# Patient Record
Sex: MALE | Race: WHITE
[De-identification: names, ages, dates, MRNs, and addresses within clinical notes are randomized per-mention and may not be internally consistent; named-entity substitution may affect disease eponyms.]

---

## 2017-02-07 NOTE — HP
DATE OF ADMISSION: 02/07/2017

 

TIME OF EVALUATION:  1500  hours.

 

REASON FOR ADMISSION:  Hematuria.

 

CONSULTATIONS:  Dr. Antonio Carr, Urology.

 

HOSPITAL COURSE:  This is a 62-year-old male with past medical history of 
benign prostatic hypertrophy status post TURP in 2015, who came to the 
emergency room with chief complaint of lower back pain, abdominal pain and 
painful urination with associated hematuria for the past 3 days.  The patient 
denied any fevers or chills.  The patient denied any diarrhea.  The patient was 
complaining of being constipated lately.  The patient denied any hematochezia 
or melena.  The patient denied any nausea or vomiting.  The patient verbalized 
that he tried over-the-counter Motrin with minimal pain relief.  The patient 
verbalized that he was not been able to sleep for the past 2 nights because of 
significant pain.  The patient denied any other significant past medical 
history including any essential hypertension or diabetes.

 

In the emergency room, the patient was noticed to have elevated AST and 
elevated alkaline phosphatase.  The patient underwent a CT scan of the abdomen 
and pelvis that showed significantly enlarged prostate with adjacent urinary 
bladder wall thickening. The CT also revealed retroperitoneal and left iliac 
lymphadenopathy along with solid nodules in the perirectal and ischiorectal 
fat.  The CT also revealed multiple osteosclerotic lesions.  The CT revealed 
significantly distended urinary bladder.  The patient's urinalysis showed 3+ 
hemoglobin.  The patient was treated with IV analgesics and IV antiemetics in 
the emergency room.  A urology consult was called by the ER physician.

 

PAST MEDICAL HISTORY:  Benign prostatic hypertrophy.

 

PAST SURGICAL HISTORY:  TURP on 09/30/2015,  left knee surgery.

 

HOME MEDICATIONS:  None.

 

ALLERGIES:  CIPROFLOXACIN.

 

SOCIAL HISTORY:  The patient lives at home.  The patient is currently retired.  
Denies any use of alcohol or tobacco.

 

FAMILY HISTORY:  Positive for CAD and dyslipidemia.

 

REVIEW OF SYSTEMS:  A 12-point review of systems were made and review of 
systems was negative other than what is mentioned in history of present illness.

 

PHYSICAL EXAMINATION:

VITAL SIGNS:  Temperature 97.9, pulse rate 87, respiratory rate 19, blood 
pressure 143/93, oxygen saturation 98% on room air.

GENERAL:  This is a well-built, well-nourished  male lying in bed in no 
apparent distress.

HEENT:  Head normocephalic and atraumatic.  Eyes:  Anicteric sclerae.  
Conjunctivae clear.

ENT:  Nasal septum is midline.  Oral mucosa is moist.

NECK:  Supple.  No JVD noticed.

RESPIRATORY:  Bilaterally clear to auscultation.  No adventitious breath 
sounds.  No use of accessory muscles of respiration.

CARDIAC:  Regular rate and rhythm.  No murmurs.

GASTROINTESTINAL:  Abdomen soft, nontender, nondistended.  Bowel sounds 
positive in all 4 quadrants.

GENITOURINARY:  Deferred.

EXTREMITIES:  No cyanosis, no clubbing, no edema.  Peripheral pulses are 
palpable.

NEUROLOGIC:  The patient is awake, alert and oriented.  Cranial nerves are 
grossly intact.

 

LABORATORY AND DIAGNOSTIC DATA:  WBC 10.1, hemoglobin 14.3, hematocrit 42.3, 
platelet count 205.  Sodium 142, potassium 4.6, chloride 90, carbon dioxide 30, 
anion gap 19, BUN 24, creatinine 0.9, glucose 120, calcium 9.6, AST 59, ALT 29, 
alkaline phosphatase 346.  



Urinalysis:  Urine nitrite negative,  leukocyte esterase negative, urine 
hemoglobin 3+, urine total protein 2+.  



CT scan of the abdomen and pelvis:  Prostate is significantly enlarged.  
Adjacent urinary bladder wall thickening is seen.  Retroperitoneal and left 
iliac lymphadenopathy is identified along with solid nodules in the perirectal 
and ischiorectal fat.  Multiple new osteosclerotic lesions.  Findings are 
consistent with new metastatic disease.  The urinary bladder is significantly 
distended, suggestive of urinary retention.  Small nonobstructive left renal 
calculus without ureterolithiasis or hydroureteronephrosis.

 

IMPRESSION:  This is a 62-year-old male who came to the emergency room with 
chief complaint of hematuria, was found to have enlarged prostate on imaging 
studies with possible underlying malignant process.  He will be admitted here 
for further treatment and evaluation.



 

ASSESSMENT AND PLAN:

1.  Hematuria.  Etiology unclear.  The patient's H and H will be monitored 
closely.  The patient will be transfused as needed.  Urology consult will be 
obtained.



2.  Enlarged prostate gland with associated lymphadenopathy and osteosclerotic 
lesions concerning for malignancy.  A prostate specific antigen will be 
obtained.  Will obtain urology consult.  Will await urology recommendations 
regarding this. Will involve oncology on the case after urology evaluation.



Plan. The patient  will be admitted to inpatient medical/surgical floor.  The 
patient will be started on DVT prophylaxis and gastrointestinal prophylaxis.  A 
chemical DVT prophylaxis will be avoided because of underlying hematuria.  The 
patient will be started on a regular diet.  The patient will remain a FULL 
CODE.  Activities will be as tolerated.

 

The rest of the patient's management will be based on the clinical course, the 
results of diagnostic studies, and inputs from consultants.

 

Based on the patient's clinical presentation, he most probably requires more 
than 2 midnights' stay for further management and evaluation of his clinical 
presentation.

 

The case and management of this patient was fully discussed with Dr. Mckeon.

 

Approximately 50 minutes was spent on the history and physical on this patient.

 

 

FLOR MCKEON MD, AM/EMILIA

DD:    02/07/2017 15:26:37

DT:    02/07/2017 15:59:30

Conf#: [id number]

DID#:  [id number]

 

MTDD

## 2017-02-07 NOTE — RADRPT
PROCEDURE:   CT Abdomen and Pelvis without contrast. 

 

CLINICAL INDICATION:    Hematuria, low back pain

 

TECHNIQUE:   CT of the abdomen and pelvis was performed on a multi-detector scanner without IV contr
ast.  Coronal and sagittal images were reformatted from the axial data set.  One or more of the foll
owing dose reduction techniques were used: automated exposure control,  adjustment of the mA and/or 
kV according to patient size, use of iterative reconstruction technique.  CTDI = 11.98 mGy. DLP = 68
7.8 mGy-cm.

 

COMPARISON:   CT, 09/26/2015 

 

FINDINGS:

 

CT abdomen:

 

The lung bases are clear.  The heart size is normal, without pericardial effusion.  Coronary arteria
l calcifications are present.  Liver, gallbladder, biliary tree, pancreas, spleen and adrenal glands
 are unremarkable.  Bilateral benign renal cysts are noted.  Tiny nonobstructive left renal calculus
 is identified, without ureterolithiasis or obstructive uropathy.  Small hiatal hernia is noted.  Th
e stomach is otherwise grossly unremarkable.

 

The aorta is of normal caliber.  Aortoiliac atherosclerotic calcifications are present.  Enlarged ri
ght retroperitoneal lymph node is identified measuring 3.0 x 1.7 cm (601-57).  The yamilet hepatis reg
ion is clear.  

 

CT pelvis:

 

No bowel obstruction, free intraperitoneal air or abscess is identified.  No diverticulosis, diverti
culitis or colitis is identified.  The appendix is well visualized and normal.  Urinary bladder is m
arkedly distended, concerning for urinary retention.  Prostate is enlarged and demonstrates irregula
r contour and posterior bulging on the left.  Posterior urinary bladder wall appears thickened, grea
ter on the right - this grossly appears contiguous with the enlarged prostate.  Right iliac lymphade
nopathy is identified measuring up to 3.6 x 2.4 cm (3-96).  Multiple solid nodules are identified wi
thin perirectal and ischiorectal fat as well measuring up to 2.0 x 1.4 cm in the left perirectal spa
ce (3-144), and 2.0 x 1.8 cm in the right ischiorectal fat (3-140).

 

Multiple osteosclerotic foci are seen involving right acetabulum and inferior pubic ramus, bilateral
 iliac bones, right sacrum, T11, L1 and L5, consistent with osseous metastatic disease.  There is de
generative spondylosis of the spine.

 

IMPRESSION:

 

1.  Prostate is significantly enlarged, demonstrating abnormal contour bulging posteriorly on the le
ft.  Adjacent urinary bladder wall thickening is seen, which grossly appears contiguous with the enl
arged prostate.  Findings are suggestive of prostate cancer, though synchronous neoplasm of the urin
yimi bladder is also possible.

2.  Retroperitoneal and left iliac lymphadenopathy is identified, along with solid nodules in the pe
rirectal and ischiorectal fat, all of which are new when compared to the prior exam.  In addition, m
ultiple new osteosclerotic lesions are identified.  Findings are consistent with new metastatic dise
ase, most likely of prostate origin.

3.  Urinary bladder is significantly distended, suggestive of urinary retention.

4.  Small nonobstructive left renal calculus is noted, without ureterolithiasis or hydroureteronephr
osis.

 

RPTAT: QQ

_____________________________________________ 

.Husam Nunes MD, MD           Date    Time 

Electronically viewed and signed by .Husam Nunes MD, MD on 02/07/2017 13:14 

 

D:  02/07/2017 13:14  T:  02/07/2017 13:14

.R/

## 2017-02-07 NOTE — ERA
ER Documentation


Chief Complaint


Date/Time


DATE: 17 


TIME: 12:21


Chief Complaint


left lower flank pain with hematuria and urinary retention for 3 days.





HPI


The patient is a 62-year-old female, presenting to the ER because of lower back 

pain, abdominal pain, painful urination for the last 3 days.  He had similar 

symptoms previously, denies fever, chills, neck pain, chest pain, dyspnea, 

vomiting, diarrhea, constipation.  He does not smoke does not drink





Past medical history: Anemia, history of urinary retention, BPH


Past surgical history: TURP 3 years ago, left knee arthroscopy





ROS


All systems reviewed and are negative except as per history of present illness.





Medications


Home Meds


Active Scripts


Cephalexin* (Keflex*) 500 Mg Capsule, 500 MG PO QID for 7 Days, CAP


   Prov:RADHA PEREZ MD         16





Allergies


Allergies:  


Coded Allergies:  


     ciprofloxacin (Verified  Allergy, Mild, 4/15/16)





PMhx/Soc


History of Surgery:  Yes (KNEE SX,CYSTOSCOPY)


Anesthesia Reaction:  No


Hx Neurological Disorder:  No


Hx Respiratory Disorders:  No


Hx Cardiac Disorders:  No


Hx Psychiatric Problems:  No


Hx Miscellaneous Medical Probl:  No


Hx Alcohol Use:  No


Hx Substance Use:  No


Hx Tobacco Use:  No





Physical Exam


Vitals





Vital Signs








  Date Time  Temp Pulse Resp B/P Pulse Ox O2 Delivery O2 Flow Rate FiO2


 


17 08:54 97.9 100 21 144/89 99   








Physical Exam


 Const:      No acute distress.


 Head:        Atraumatic.


 Eyes:       Normal Conjunctiva.


 ENT:         Normal External Ears, Nose and Mouth.


 Neck:        Full range of motion.  No meningismus.


 Resp:         Clear to auscultation bilaterally.


 Cardio:       Regular rate and rhythm, no murmurs.


 Abd:         Soft,  non distended, normal bowel sounds, diffuse abdominal 

tenderness, no rigidity, rebound, CVA tenderness.  Mild lumbar tenderness


 Skin:         No petechiae or rashes.


 Back:        No midline or flank tenderness.


 Ext:          No cyanosis, or edema.


 Neur:        Awake and alert. No focal deficit


 Psych:        Normal Mood and Affect.


Result Diagram:  


17 1305                                                                    

            17 1305





Results 24 hrs





 Laboratory Tests








Test


  17


13:05


 


Alanine Aminotransferase


(ALT/SGPT) 29IU/L 


 


 


Albumin 4.1g/dl 


 


Albumin/Globulin Ratio 1.20 


 


Alkaline Phosphatase 346IU/L 


 


Anion Gap 19 


 


Aspartate Amino Transf


(AST/SGOT) 59IU/L 


 


 


Basophils # 0.010^3/ul 


 


Basophils % 0.2% 


 


Blood Morphology Comment  


 


Blood Urea Nitrogen 24mg/dl 


 


Calcium Level 9.6mg/dl 


 


Carbon Dioxide Level 30mmol/L 


 


Chloride Level 98mmol/L 


 


Creatinine 0.95mg/dl 


 


Direct Bilirubin 0.00mg/dl 


 


Eosinophils # 0.010^3/ul 


 


Eosinophils % 0.3% 


 


Globulin 3.40g/dl 


 


Glucose Level 120mg/dl 


 


Hematocrit 42.3% 


 


Hemoglobin 14.3g/dl 


 


Indirect Bilirubin 0.9mg/dl 


 


Lipase 61U/L 


 


Lymphocytes # 1.010^3/ul 


 


Lymphocytes % 10.0% 


 


Mean Corpuscular Hemoglobin 30.6pg 


 


Mean Corpuscular Hemoglobin


Concent 33.9g/dl 


 


 


Mean Corpuscular Volume 90.3fl 


 


Mean Platelet Volume 6.9fl 


 


Monocytes # 0.810^3/ul 


 


Monocytes % 7.5% 


 


Neutrophils # 8.310^3/ul 


 


Neutrophils % 82.0% 


 


Nucleated Red Blood Cells # 0.010^3/ul 


 


Nucleated Red Blood Cells % 0.0/100WBC 


 


Platelet Count 71932^3/UL 


 


Potassium Level 4.6mmol/L 


 


Red Blood Count 4.6910^6/ul 


 


Red Cell Distribution Width 12.5% 


 


Sodium Level 142mmol/L 


 


Total Bilirubin 0.9mg/dl 


 


Total Protein 7.5g/dl 


 


Urine Bilirubin 1+ 


 


Urine Clarity CLOUDY 


 


Urine Color RED 


 


Urine Glucose NEGATIVE% 


 


Urine Hemoglobin 3+ 


 


Urine Ictotest Pending 


 


Urine Ketones TRACE 


 


Urine Leukocyte Esterase NEGATIVE 


 


Urine Microscopic RBC >200/HPF 


 


Urine Microscopic WBC 0-2/HPF 


 


Urine Nitrite NEGATIVE 


 


Urine Specific Gravity 1.015 


 


Urine Total Protein 2+ 


 


Urine Urobilinogen 0.2  E.U./dL 


 


Urine pH 5.0 


 


White Blood Count 10.110^3/ul 








 Current Medications








 Medications


  (Trade)  Dose


 Ordered  Sig/Ruddy


 Route


 PRN Reason  Start Time


 Stop Time Status Last Admin


Dose Admin


 


 Morphine Sulfate


  (morphine)  4 mg  ONCE  STAT


 IV


   17 12:33


 17 12:35 DC 17 12:33


 


 


 Ondansetron HCl


  (Zofran Inj)  4 mg  ONCE  STAT


 IV


   17 12:33


 17 12:35 DC 17 12:33


 











Procedures/[facility]


 [address] [address]


 Radiology Main Line: [phone]





 DIAGNOSTIC IMAGING REPORT





 Patient: JOSSELYN NORRIS   : 1955   Age: 62  Sex: M            

            


 MR #:    [id number]   Acct #:   [id number]    DOS: 17 1233


 Ordering MD: MASOUD MAHMOOD MD   Location:  E/R   Room/Bed:                  

                          


 








PROCEDURE:   CT Abdomen and Pelvis without contrast. 


 


CLINICAL INDICATION:    Hematuria, low back pain


 


TECHNIQUE:   CT of the abdomen and pelvis was performed on a multi-detector 

scanner without IV contrast.  Coronal and sagittal images were reformatted from 

the axial data set.  One or more of the following dose reduction techniques 

were used: automated exposure control,  adjustment of the mA and/or kV 

according to patient size, use of iterative reconstruction technique.  CTDI = 

11.98 mGy. DLP = 687.8 mGy-cm.


 


COMPARISON:   CT, 2015 


 


FINDINGS:


 


CT abdomen:


 


The lung bases are clear.  The heart size is normal, without pericardial 

effusion.  Coronary arterial calcifications are present.  Liver, gallbladder, 

biliary tree, pancreas, spleen and adrenal glands are unremarkable.  Bilateral 

benign renal cysts are noted.  Tiny nonobstructive left renal calculus is 

identified, without ureterolithiasis or obstructive uropathy.  Small hiatal 

hernia is noted.  The stomach is otherwise grossly unremarkable.


 


The aorta is of normal caliber.  Aortoiliac atherosclerotic calcifications are 

present.  Enlarged right retroperitoneal lymph node is identified measuring 3.0 

x 1.7 cm (601-57).  The yamilet hepatis region is clear.  


 


CT pelvis:


 


No bowel obstruction, free intraperitoneal air or abscess is identified.  No 

diverticulosis, diverticulitis or colitis is identified.  The appendix is well 

visualized and normal.  Urinary bladder is markedly distended, concerning for 

urinary retention.  Prostate is enlarged and demonstrates irregular contour and 

posterior bulging on the left.  Posterior urinary bladder wall appears thickened

, greater on the right - this grossly appears contiguous with the enlarged 

prostate.  Right iliac lymphadenopathy is identified measuring up to 3.6 x 2.4 

cm (3-96).  Multiple solid nodules are identified within perirectal and 

ischiorectal fat as well measuring up to 2.0 x 1.4 cm in the left perirectal 

space (3-144), and 2.0 x 1.8 cm in the right ischiorectal fat (3-140).


 


Multiple osteosclerotic foci are seen involving right acetabulum and inferior 

pubic ramus, bilateral iliac bones, right sacrum, T11, L1 and L5, consistent 

with osseous metastatic disease.  There is degenerative spondylosis of the 

spine.


 


IMPRESSION:


 


1.  Prostate is significantly enlarged, demonstrating abnormal contour bulging 

posteriorly on the left.  Adjacent urinary bladder wall thickening is seen, 

which grossly appears contiguous with the enlarged prostate.  Findings are 

suggestive of prostate cancer, though synchronous neoplasm of the urinary 

bladder is also possible.


2.  Retroperitoneal and left iliac lymphadenopathy is identified, along with 

solid nodules in the perirectal and ischiorectal fat, all of which are new when 

compared to the prior exam.  In addition, multiple new osteosclerotic lesions 

are identified.  Findings are consistent with new metastatic disease, most 

likely of prostate origin.


3.  Urinary bladder is significantly distended, suggestive of urinary retention.


4.  Small nonobstructive left renal calculus is noted, without ureterolithiasis 

or hydroureteronephrosis.


 


RPTAT: QQ


_____________________________________________ 


.Husam Nunes MD, MD           Date    Time 


Electronically viewed and signed by .Husam Nunes MD, MD on 2017 13:

14 


 


D:  2017 13:14  T:  2017 13:14


.R/





CC: MASOUD MAHMOOD MD





MEDICAL MAKING DECISION: The patient is a 62-year-old male, presenting with 

acute urinary retention, acute hematuria, possible metastatic prostate CA.  He 

was treated with morphine 4 mg IV for pain and Zofran formula IV for nausea 

with good response.  I have requested for Diego catheter to be inserted due to 

acute urinary retention.  The differential diagnoses considered include but are 

not limited to cholelithiasis, cholecystitis, cystitis, pancreatitis, hepatitis

, gastritis, peptic ulcer disease, gastric ulcer, appendicitis, diverticulitis, 

cholangitis, choledocholithiasis, partial small bowel obstruction.





Departure


Diagnosis:  


 Primary Impression:  


 Prostate cancer


 Additional Impressions:  


 Urinary retention


 Abnormal LFTs


Condition:  Stable


Comments


Consultation: I discussed the patient with the urologist Dr. Love who has 

seen him in the past.  He was made aware of the lab, treatment, the patient 

condition.  He accepted the consult at 2:20 PM





I discussed the findings with the patient. I discussed the patient with his 

physician   who was made aware of the lab, the treatment, the patient 

condition and my discussion with the urologist. The patient is admitted to 

medical surgery bed at 2:25 PM





The patient's blood pressure was elevated (>120/80) but appears stable without 

evidence of hypertension emergency or urgency.  The patient was counseled about 

the risks of hypertension and urged to pursue outpatient monitoring and therapy 

within a week after discharge with their primary care physician.











MASOUD MAHMOOD MD 2017 12:22

## 2017-02-08 NOTE — PN
Date/Time of Note


Date/Time of Note


DATE: 2/8/17 


TIME: 14:37





Assessment/Plan


VTE Prophylaxis


VTE Prophylaxis Intervention:  SCD's





Lines/Catheters


IV Catheter Type (from Nrsg):  Peripheral IV





Assessment/Plan


Assessment/Plan


1.  Urinary retention due to prostate cancer and urethral stricture, s/p 

dilatation, keep Diego, follow up with urology, follow up with urine c/s


2.  Metastatic adenocarcinoma of the prostate, on leuprolide monthly


3. Constipation, laxatives





Subjective


24 Hr Interval Summary


Free Text/Dictation


constipation, some low back pain





Exam/Review of Systems


Vital Signs


Vitals





 Vital Signs








  Date Time  Temp Pulse Resp B/P Pulse Ox O2 Delivery O2 Flow Rate FiO2


 


2/8/17 09:07 99.2 85 16 120/70 94   


 


2/7/17 17:50      Room Air  














 Intake and Output   


 


 2/7/17 2/7/17 2/8/17





 15:00 23:00 07:00


 


Intake Total   3400 ml


 


Output Total   2900 ml


 


Balance   500 ml











Exam


Constitutional:  alert, oriented, well developed


Psych:  nl mood/affect, no complaints


Head:  atraumatic, normocephalic


Eyes:  EOMI, PERRL, nl conjunctiva, nl lids


ENMT:  nl external ears & nose, nl lips & teeth


Neck:  non-tender, supple


Respiratory:  clear to auscultation, normal air movement, 


   No congested cough, No crackles/rales, No diminished breath sounds, No 

intercostal retraction, No labored breathing, No other, No respirations, No 

tactile fremitus, No wheezing


Cardiovascular:  nl pulses, regular rate and rhythm, 


   No S3, No S4, No bruits, No diastolic murmur, No edema, No gallop, No 

irregular rhythm, No jugular venous distention (JVD), No murmurs/extra sounds, 

No other, No rub, No systolic murmur


Gastrointestinal:  nl liver, spleen, non-tender, soft, 


   No ascites, No bowel sounds, No distended, No firm, No hepatomegaly, No mass

, No other, No rebound or guarding, No splenomegaly, No surgical scars, No 

tender


Musculoskeletal:  nl extremities to inspection


Extremities:  normal pulses, 


   No calf tenderness, No clubbing, No cyanosis, No edema, No other, No 

palpable cord, No pitting pedal edema, No tenderness


Neurological:  CNS II-XII intact, nl mental status, nl speech, nl strength


Skin:  nl turgor, rash or lesions





Results


Result Diagram:  


2/8/17 0510                                                                    

            2/8/17 0510





Results 24 hrs





Laboratory Tests








Test


  2/8/17


05:10


 


Alanine Aminotransferase


(ALT/SGPT) 29  


 


 


Albumin 3.7  


 


Albumin/Globulin Ratio 1.12  


 


Alkaline Phosphatase 325  H


 


Anion Gap 15  


 


Aspartate Amino Transf


(AST/SGOT) 53  H


 


 


Basophils # 0.0  


 


Basophils % 0.4  


 


Blood Urea Nitrogen 22  H


 


Calcium Level 8.8  


 


Carbon Dioxide Level 30  


 


Carcinoembryonic Antigen 4.5  


 


Chloride Level 99  


 


Cholesterol Level 158  


 


Cholesterol/HDL Ratio 4.2  


 


Creatinine 0.95  


 


Direct Bilirubin 0.00  


 


Eosinophils # 0.1  


 


Eosinophils % 1.1  


 


Free Thyroxine 1.01  


 


Globulin 3.30  H


 


Glucose Level 103  


 


HDL Cholesterol 37  


 


Hematocrit 38.5  L


 


Hemoglobin 13.2  L


 


Hemoglobin A1c 5.8  


 


Indirect Bilirubin 0.7  


 


LDL Cholesterol, Calculated 99  


 


Lactate Dehydrogenase 836  H


 


Lymphocytes # 1.3  


 


Lymphocytes % 15.4  


 


Magnesium Level 2.1  


 


Mean Corpuscular Hemoglobin 31.1  


 


Mean Corpuscular Hemoglobin


Concent 34.4  


 


 


Mean Corpuscular Volume 90.6  


 


Mean Platelet Volume 7.6  


 


Monocytes # 0.8  


 


Monocytes % 9.9  


 


Neutrophils # 6.1  


 


Neutrophils % 73.2  


 


Nucleated Red Blood Cells # 0.0  


 


Nucleated Red Blood Cells % 0.0  


 


Platelet Count 194  


 


Potassium Level 4.3  


 


Red Blood Count 4.25  L


 


Red Cell Distribution Width 12.6  


 


Sodium Level 140  


 


Thyroid Stimulating Hormone


(TSH) 1.240  


 


 


Total Bilirubin 0.7  


 


Total Protein 7.0  


 


Triglycerides Level 112  


 


White Blood Count 8.3  











Medications


Medications





 Current Medications


Sodium Chloride (NS) 1,000 ml @  75 mls/hr [id number] IV  Last administered on 2/8/ 17at 06:39; Admin Dose 75 MLS/HR;  Start 2/7/17 at 15:13


Ondansetron HCl (Zofran Inj) 4 mg Q6H  PRN IV NAUSEA AND/OR VOMITING;  Start 2/7 /17 at 15:30


Acetaminophen (Tylenol Tab) 650 mg Q6H  PRN PO PAIN LEVEL 1-3 OR FEVER;  Start 2 /7/17 at 15:30


Acetaminophen/ Hydrocodone Bitart (Norco (5/325)) 1 tab Q6H  PRN PO MODERATE 

PAIN LEVEL 4-6 Last administered on 2/7/17at 16:32; Admin Dose 1 TAB;  Start 2/7 /17 at 15:30


Morphine Sulfate (morphine) 2 mg Q4H  PRN IV SEVERE PAIN LEVEL 7-10;  Start 2/7/ 17 at 15:30


Magnesium Hydroxide (Milk Of Mag) 30 ml DAILY  PRN PO CONSTIPATION;  Start 2/7/ 17 at 15:30


Bisacodyl (Dulcolax) 5 mg DAILY  PRN PO CONSTIPATION;  Start 2/7/17 at 15:30


Famotidine (Pepcid) 20 mg Q12 PO  Last administered on 2/8/17at 08:41; Admin 

Dose 20 MG;  Start 2/7/17 at 21:00


Leuprolide Acetate (Lupron Depot) 7.5 mg ONCE  ONCE IM ;  Start 2/7/17 at 21:00

;  Stop 2/7/17 at 21:01;  Status LY COHN MD Feb 8, 2017 14:50

## 2017-02-08 NOTE — PN
DATE:  02/08/2017

 

 

SUBJECTIVE:  Urinary retention and urethral stricture and metastatic prostate cancer.

 

HISTORY OF PRESENT ILLNESS:  The patient is feeling much better today.  His urine is clear.  He was 
urinating blood when he came in and in fact he was in urinary retention.  He did have a urethral str
icture that I did dilate and then inserted a catheter for him.

 

OBJECTIVE:

VITAL SIGNS:  Temperature is 99.2, respirations 16, blood pressure 120/70, pulse is 85.

ABDOMEN:  Soft.  

 

The Diego catheter is draining clear urine.  The urine culture no growth after 24 hours.

 

LABORATORY DATA:  CBC shows a white count of 8.3, hemoglobin 13.2, hematocrit 38.5.  BUN is 22, crea
tinine is 0.95.  Electrolytes are normal.  The CT of the abdomen and pelvis that he had showed that 
retroperitoneal and iliac lymphadenopathy.  Also, he did have multiple osteosclerotic lesions not id
entified.  We will get a bone scan to visualize these better for future reference and he already tod
ay received 7.5 mg of Lupron Depot IM in the right buttock area.  The sclerotic foci involving the r
ight acetabulum, inferior pubic ramus, bilateral iliac bones, right sacrum, T11, L1 and L5.

 

 

Dictated By: EM RIGGS/EMILIA

DD:    02/08/2017 19:08:31

DT:    02/08/2017 19:26:24

Conf#: [id number]

DID#:  [id number]

## 2017-02-09 NOTE — PN
Date/Time of Note


Date/Time of Note


DATE: 2/9/17 


TIME: 15:28





Assessment/Plan


VTE Prophylaxis


VTE Prophylaxis Intervention:  SCD's





Lines/Catheters


IV Catheter Type (from Nrsg):  Peripheral IV





Assessment/Plan


Assessment/Plan


1.  Urinary retention due to prostate cancer and urethral stricture, s/p 

dilatation, keep Diego, follow up with urology for discharge plan(Diego)


2.  Metastatic adenocarcinoma of the prostate, on leuprolide monthly


3. Constipation, laxatives





Subjective


24 Hr Interval Summary


Free Text/Dictation


no fever or chills





Exam/Review of Systems


Vital Signs


Vitals





 Vital Signs








  Date Time  Temp Pulse Resp B/P Pulse Ox O2 Delivery O2 Flow Rate FiO2


 


2/9/17 08:01 98.5 82 19 127/77 95   


 


2/7/17 17:50      Room Air  














 Intake and Output   


 


 2/8/17 2/8/17 2/9/17





 15:00 23:00 07:00


 


Intake Total  3740 ml 3075 ml


 


Output Total  4300 ml 3400 ml


 


Balance  -560 ml -325 ml











Exam


Constitutional:  alert, oriented, well developed


Psych:  nl mood/affect, no complaints


Head:  atraumatic, normocephalic


Eyes:  EOMI, PERRL, nl conjunctiva, nl lids


ENMT:  nl external ears & nose, nl lips & teeth, nl nasal mucosa & septum


Neck:  supple


Respiratory:  clear to auscultation, normal air movement, 


   No congested cough, No crackles/rales, No diminished breath sounds, No 

intercostal retraction, No labored breathing, No other, No respirations, No 

tactile fremitus, No wheezing


Cardiovascular:  nl pulses, regular rate and rhythm, 


   No S3, No S4, No bruits, No diastolic murmur, No edema, No gallop, No 

irregular rhythm, No jugular venous distention (JVD), No murmurs/extra sounds, 

No other, No rub, No systolic murmur


Gastrointestinal:  nl liver, spleen, non-tender, soft, 


   No ascites, No bowel sounds, No distended, No firm, No hepatomegaly, No mass

, No other, No rebound or guarding, No splenomegaly, No surgical scars, No 

tender


Musculoskeletal:  nl extremities to inspection


Extremities:  normal pulses, 


   No calf tenderness, No clubbing, No cyanosis, No edema, No other, No 

palpable cord, No pitting pedal edema, No tenderness


Neurological:  CNS II-XII intact, nl mental status, nl speech, nl strength


Skin:  nl turgor, rash or lesions


Lymph:  nl lymph nodes





Results


Result Diagram:  


2/8/17 0510                                                                    

            2/8/17 0510








Medications


Medications





 Current Medications


Sodium Chloride (NS) 1,000 ml @  75 mls/hr [id number] IV  Last administered on 2/9/ 17at 08:27; Admin Dose 75 MLS/HR;  Start 2/7/17 at 15:13


Ondansetron HCl (Zofran Inj) 4 mg Q6H  PRN IV NAUSEA AND/OR VOMITING;  Start 2/7 /17 at 15:30


Acetaminophen (Tylenol Tab) 650 mg Q6H  PRN PO PAIN LEVEL 1-3 OR FEVER;  Start 2 /7/17 at 15:30


Acetaminophen/ Hydrocodone Bitart (Norco (5/325)) 1 tab Q6H  PRN PO MODERATE 

PAIN LEVEL 4-6 Last administered on 2/8/17at 23:47; Admin Dose 1 TAB;  Start 2/7 /17 at 15:30


Morphine Sulfate (morphine) 2 mg Q4H  PRN IV SEVERE PAIN LEVEL 7-10;  Start 2/7/ 17 at 15:30


Magnesium Hydroxide (Milk Of Mag) 30 ml DAILY  PRN PO CONSTIPATION;  Start 2/7/ 17 at 15:30


Bisacodyl (Dulcolax) 5 mg DAILY  PRN PO CONSTIPATION;  Start 2/7/17 at 15:30


Famotidine (Pepcid) 20 mg Q12 PO  Last administered on 2/9/17at 08:26; Admin 

Dose 20 MG;  Start 2/7/17 at 21:00


Lactulose (Enulose) 20 gm BID  PRN PO CONSTIPATION Last administered on 2/8/ 17at 15:03; Admin Dose 20 GM;  Start 2/8/17 at 15:00











LY SMITH MD Feb 9, 2017 15:30

## 2017-02-09 NOTE — RADRPT
PROCEDURE:   Whole body bone scan study 

 

CLINICAL INDICATION:   62 -year-old patient with prostate cancer, for evaluation for skeletal metast
ases. 

 

TECHNIQUE:   Following the intravenous injection of 21.5 mCi of Tc-99m MDP, whole body anterior and 
posterior planar images were obtained along with spot views of the head, neck, chest, abdomen and pe
lvis . 

 

COMPARISON:   No prior bone scans are available for comparison. CT scan of the abdomen and pelvis da
cindy February 7, 2017.

 

FINDINGS:

 

Numerous abnormal focal areas of intensely increased tracer activity are seen in the right shoulder,
 rib cages bilaterally, spine, sacrum, pelvic bones bilaterally, distal humerus bilaterally, and lef
t proximal femur.  

 

Mildly increased activity seen in the left knee, which is likely related to degenerative disease.

 

No other definite abnormal areas of increased activity or asymmetries are visualized in the study an
d distribution of radionuclide is homogeneous in the skull, spine, rib cages, sternum, pelvis and vi
sualized portions of the upper and lower extremities.

 

Of incidental note, there is no evidence of mass abnormalities of the poorly visualized kidneys .

 

 

IMPRESSION:

 

 

1.  Multiple abnormal focal areas of intensely increased tracer activity in the axial and appendicul
ar skeleton, as described above, consistent with multiple skeletal metastases.

 

2.  Likely degenerative changes of the left knee.

 

RPTAT: QQ

_____________________________________________ 

.Khushboo Preston MD, MD           Date    Time 

Electronically viewed and signed by .Khushboo Preston MD, MD on 02/09/2017 21:10 

 

D:  02/09/2017 21:10  T:  02/09/2017 21:10

.L/

## 2017-02-09 NOTE — PN
DATE:  02/09/2017

 

 

SUBJECTIVE:  Abdominal pain and low back pain that has improved, urinary retention and urethral stri
cture and metastatic prostate cancer.

 

OBJECTIVE:

VITAL SIGNS:  The patient is afebrile.  Temperature is 98.5, pulse is 82, respirations 19, blood pre
ssure is 127/77.

ABDOMEN:  Soft.  There is no abdominal mass palpable.  

GENITOURINARY:  The Diego catheter is draining clear urine.

 

LABORATORY/DIAGNOSTIC DATA:  The last CBC shows a white count of 8.3, hemoglobin 13.2.  The BUN is 2
2, creatinine 0.95.  His PSA was 54.5.  Again, his prior CT scan of the abdomen and pelvis did show 
multiple osteosclerotic foci involving the right acetabulum, inferior pubic ramus, bilateral iliac b
ones, right sacrum, T11, L1 and L5 and all these were consistent with osseous metastatic disease.  T
he prostate is significantly enlarged with bulging posteriorly, the urinary bladder was at that time
 distended, but since then he has had a Diego catheter.  The patient is supposed to have bone scan a
nd that is pending.  The urine culture so far no growth in 48 hours.

 

IMPRESSION:  Metastatic prostate cancer.  The patient received 1 injection of Lupron Depot 7.5 mg ye
sterday.  He is going to have a bone scan today.  Urinary retention, he has a Diego catheter which I
 will remove tomorrow and check his postvoid residual.  The urethral stricture has already been dila
cindy.

 

 

Dictated By: EM RIGGS/EMILIA

DD:    02/09/2017 15:42:46

DT:    02/09/2017 16:05:59

Conf#: [id number]

DID#:  [id number]

## 2017-02-10 NOTE — PN
Date/Time of Note


Date/Time of Note


DATE: 2/10/17 


TIME: 14:56





Assessment/Plan


VTE Prophylaxis


VTE Prophylaxis Intervention:  SCD's





Lines/Catheters


IV Catheter Type (from [facility]):  Saline Lock


Urinary Cath still in place:  No





Assessment/Plan


Assessment/Plan


1.  Urinary retention due to prostate cancer and urethral stricture, s/p 

dilatation, still with urinary retention, follow up with urology 


2.  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


3. Constipation, laxatives





Subjective


24 Hr Interval Summary


Free Text/Dictation


unable to urinate after Diego removed





Exam/Review of Systems


Vital Signs


Vitals





 Vital Signs








  Date Time  Temp Pulse Resp B/P Pulse Ox O2 Delivery O2 Flow Rate FiO2


 


2/9/17 20:38 99.1 82 20 148/94 94   


 


2/7/17 17:50      Room Air  














 Intake and Output   


 


 2/9/17 2/9/17 2/10/17





 15:00 23:00 07:00


 


Intake Total  5460 ml 1200 ml


 


Output Total  5510 ml 1540 ml


 


Balance  -50 ml -340 ml











Exam


Constitutional:  alert, oriented, well developed


Psych:  nl mood/affect, no complaints


Head:  atraumatic, normocephalic


Eyes:  EOMI, PERRL, nl conjunctiva, nl lids


ENMT:  nl external ears & nose, nl lips & teeth, nl nasal mucosa & septum


Neck:  non-tender, supple


Respiratory:  clear to auscultation, normal air movement, 


   No congested cough, No crackles/rales, No diminished breath sounds, No 

intercostal retraction, No labored breathing, No other, No respirations, No 

tactile fremitus, No wheezing


Cardiovascular:  nl pulses, regular rate and rhythm, 


   No S3, No S4, No bruits, No diastolic murmur, No edema, No gallop, No 

irregular rhythm, No jugular venous distention (JVD), No murmurs/extra sounds, 

No other, No rub, No systolic murmur


Gastrointestinal:  nl liver, spleen, non-tender, soft, 


   No ascites, No bowel sounds, No distended, No firm, No hepatomegaly, No mass

, No other, No rebound or guarding, No splenomegaly, No surgical scars, No 

tender


Musculoskeletal:  nl extremities to inspection


Extremities:  normal pulses, 


   No calf tenderness, No clubbing, No cyanosis, No edema, No other, No 

palpable cord, No pitting pedal edema, No tenderness


Neurological:  CNS II-XII intact, nl mental status, nl speech, nl strength


Skin:  nl turgor, rash or lesions


Lymph:  nl lymph nodes





Results


Result Diagram:  


2/8/17 0510                                                                    

            2/8/17 0510








Medications


Medications





 Current Medications


Sodium Chloride (NS) 1,000 ml @  0 mls/hr [id number] IV  Last administered on 2/9/ 17at 08:27; Admin Dose 75 MLS/HR;  Start 2/7/17 at 15:13


Ondansetron HCl (Zofran Inj) 4 mg Q6H  PRN IV NAUSEA AND/OR VOMITING;  Start 2/7 /17 at 15:30


Acetaminophen (Tylenol Tab) 650 mg Q6H  PRN PO PAIN LEVEL 1-3 OR FEVER;  Start 2 /7/17 at 15:30


Acetaminophen/ Hydrocodone Bitart (Norco (5/325)) 1 tab Q6H  PRN PO MODERATE 

PAIN LEVEL 4-6 Last administered on 2/10/17at 14:28; Admin Dose 1 TAB;  Start 2/ 7/17 at 15:30


Morphine Sulfate (morphine) 2 mg Q4H  PRN IV SEVERE PAIN LEVEL 7-10;  Start 2/7/ 17 at 15:30


Magnesium Hydroxide (Milk Of Mag) 30 ml DAILY  PRN PO CONSTIPATION;  Start 2/7/ 17 at 15:30


Bisacodyl (Dulcolax) 5 mg DAILY  PRN PO CONSTIPATION;  Start 2/7/17 at 15:30


Famotidine (Pepcid) 20 mg Q12 PO  Last administered on 2/10/17at 08:32; Admin 

Dose 20 MG;  Start 2/7/17 at 21:00


Lactulose (Enulose) 20 gm BID  PRN PO CONSTIPATION Last administered on 2/8/ 17at 15:03; Admin Dose 20 GM;  Start 2/8/17 at 15:00











LY SMITH MD Feb 10, 2017 15:04

## 2017-02-11 NOTE — PN
DATE:  02/10/2017

 

 

SUBJECTIVE:  Metastatic prostate cancer with urinary retention and urethral stricture.  The patient 
did have an indwelling Diego catheter.  I had ordered the Diego catheter to be removed this morning 
at 6:00 a.m.; however, the staff did remove it last night at 6 p.m.  The patient was checked for pos
tvoid residual and he was voiding, but a small amount, and he needed to be straight catheterized mul
tiple times.

 

OBJECTIVE:

VITAL SIGNS:  His temperature today was 98.5, respirations 19, pulse is 82, blood pressure 127/77.

ABDOMEN:  Soft.  There is no mass palpable.

 

LABORATORY DATA:  His LDH is 836, alkaline phosphatase 325.  

 

The bone scan that he had did show metastatic disease, multiple abnormal focal areas of intensely in
creased tracer, and that is the right shoulder, rib cages bilaterally, the spine, the sacrum, the pe
lvic bones bilaterally, the distal humerus bilaterally and the left proximal femur.  The patient has
 already received 1 dose of Lupron Depot 7.5 mg. The patient has been voiding today a small amount. 
 The last time he voided about 120 mL and a bladder scan showed 368 and he was straight catheterized
 for 400 mL.

 

IMPRESSION:  

1.  Metastatic prostate cancer.

2.  Urinary retention. 

3.  Urethral stricture that has been dilated.

 

PLAN:  Continue to check on his voiding and his postvoid residual, and do straight catheterizations 
as needed.  Also, the patient will be started also on Flomax and finasteride, with the hope that peter
t may help him urinate better.  If he does not, then I may have to do, again, a cystoscopy and TURP 
to relieve his obstruction.

 

 

Dictated By: EM RIGGS/EMILIA

DD:    02/10/2017 18:42:00

DT:    02/11/2017 06:42:31

Conf#: [id number]

DID#:  [id number]

## 2017-02-11 NOTE — PN
DATE:  02/11/2017

 

 

SUBJECTIVE:  Metastatic prostate cancer, urinary retention and urethral stricture.  

 

The patient has been having difficulty urinating, even after he was dilated and the catheter was rem
paulette, but we started him on Flomax yesterday and he already received the injection of Lupron Depot, 
and apparently he is urinating better.  He has been voiding and the postvoid residual is less than 3
00 mL, and in fact, he is more comfortable today; therefore, we will continue to monitor his voiding
 and postvoid residual and hopefully he will continue to urinate good, then we could probably send h
im home tomorrow, and if he does not, then I may have to scope him and do an internal urethrotomy fu
rther and may be also resect the prostate.

 

OBJECTIVE FINDINGS:

VITAL SIGNS:  Temperature 98.6, respirations 18, blood pressure 119/77, pulse is 79.

ABDOMEN:  Soft.  The bladder is not distended.

 

The bone scan again did show numerous areas of intense activity consistent with mets to the bone, an
d that includes the right shoulder, the rib cage bilaterally, the spine, the sacrum, pelvic bones  b
ilaterally, distal humerus bilaterally and left proximal femur.  The patient stated he does not have
 any pain from his bones and he only has mild pain when he tries to urinate.

 

PLAN:  To continue the present treatment.  Continue the finasteride, tamsulosin, and he already rece
ived the Lupron Depot.

 

 

Dictated By: EM RIGGS/EMILIA

DD:    02/11/2017 11:16:46

DT:    02/11/2017 11:48:23

Conf#: [id number]

DID#:  [id number]

## 2017-02-11 NOTE — PN
Date/Time of Note


Date/Time of Note


DATE: 2/11/17 


TIME: 11:24





Assessment/Plan


VTE Prophylaxis


VTE Prophylaxis Intervention:  SCD's





Lines/Catheters


IV Catheter Type (from [facility]):  Saline Lock


Urinary Cath still in place:  No





Assessment/Plan


Chief Complaint/Hosp Course


Assessment/Plan


1.  Urinary retention due to prostate cancer and urethral stricture, s/p 

dilatation, still with urinary retention, follow up with urology 


2.  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 

follow-up oncology recommendations


3. Constipation, laxatives





Plan to discharge home tomorrow if cleared by urology and oncology


Problems:  





Subjective


24 Hr Interval Summary


Free Text/Dictation


Patient denies any chest pain or shortness of breath


Was able to urinate this a.m.


No nausea vomiting diarrhea





Exam/Review of Systems


Vital Signs


Vitals





 Vital Signs








  Date Time  Temp Pulse Resp B/P Pulse Ox O2 Delivery O2 Flow Rate FiO2


 


2/11/17 07:56 98.6 79 18 119/77 95   


 


2/7/17 17:50      Room Air  














 Intake and Output   


 


 2/10/17 2/10/17 2/11/17





 15:00 23:00 07:00


 


Intake Total  2200 ml 1400 ml


 


Output Total  700 ml 1000 ml


 


Balance  1500 ml 400 ml











Exam


General: The patient is well-developed, Not  in acute distress.


HEENT: Atraumatic, normocephalic. The pupils are equal and round .


 Neck: Supple with full range of motion. 


Chest: Normal expansion of the thorax during inspiration


Lungs: Clear to auscultation bilaterally


Heart: Normal S1-S2, Regular rhythm and rate.


Abdomen: Soft , nontender,  nondistended , bowel sounds are present.


Extremities: Normal to inspection, no edema no cyanosis


Neurologic: Normal mental status,The patient is awake,  alert and oriented .





Results


Result Diagram:  


2/8/17 0510                                                                    

            2/8/17 0510








Medications


Medications





 Current Medications


Sodium Chloride (NS) 1,000 ml @  0 mls/hr [id number] IV  Last administered on 2/9/ 17at 08:27; Admin Dose 75 MLS/HR;  Start 2/7/17 at 15:13


Ondansetron HCl (Zofran Inj) 4 mg Q6H  PRN IV NAUSEA AND/OR VOMITING;  Start 2/7 /17 at 15:30


Acetaminophen (Tylenol Tab) 650 mg Q6H  PRN PO PAIN LEVEL 1-3 OR FEVER;  Start 2 /7/17 at 15:30


Acetaminophen/ Hydrocodone Bitart (Norco (5/325)) 1 tab Q6H  PRN PO MODERATE 

PAIN LEVEL 4-6 Last administered on 2/10/17at 14:28; Admin Dose 1 TAB;  Start 2/ 7/17 at 15:30


Morphine Sulfate (morphine) 2 mg Q4H  PRN IV SEVERE PAIN LEVEL 7-10;  Start 2/7/ 17 at 15:30


Magnesium Hydroxide (Milk Of Mag) 30 ml DAILY  PRN PO CONSTIPATION;  Start 2/7/ 17 at 15:30


Bisacodyl (Dulcolax) 5 mg DAILY  PRN PO CONSTIPATION;  Start 2/7/17 at 15:30


Famotidine (Pepcid) 20 mg Q12 PO  Last administered on 2/11/17at 09:14; Admin 

Dose 20 MG;  Start 2/7/17 at 21:00


Lactulose (Enulose) 20 gm BID  PRN PO CONSTIPATION Last administered on 2/10/

17at 21:06; Admin Dose 20 GM;  Start 2/8/17 at 15:00


Tamsulosin HCl (Flomax) 0.4 mg HS PO  Last administered on 2/10/17at 22:29; 

Admin Dose 0.4 MG;  Start 2/10/17 at 21:00


Finasteride (Proscar) 5 mg DAILY PO  Last administered on 2/11/17at 09:14; 

Admin Dose 5 MG;  Start 2/10/17 at 19:00


Lorazepam (Ativan) 1 mg HS PO  Last administered on 2/10/17at 23:10; Admin Dose 

1 MG;  Start 2/10/17 at 22:57











VALDEMAR COCHRAN MD Feb 11, 2017 11:25

## 2017-02-12 NOTE — PN
DATE:  02/12/2017

 

 

SUBJECTIVE:  He had difficulty emptying his bladder and urinary retention, 
urethral stricture, metastatic prostate cancer.  The patient states that he is 
voiding, but he is not emptying his bladder.

 

OBJECTIVE:

VITAL SIGNS:  Show a temperature of 98.5, pulse 75, respiration 18, blood 
pressure 128/76.

ABDOMEN:  Soft.

GENITOURINARY:  The external genitalia are normal.  The patient has been 
voiding 50 mL each time and a PVR has been 170 to 230.  However, earlier in the 
day he had a higher postvoid residual and was catheterized.  

 

The patient is concerned at the present that: 

1.  He is not able to empty his bladder.  

2.  He is concerned about taking the medications.  He thought that taking the 
finasteride is blocking him because read in some of the side effects that he is 
not able to urinate.  I explained to him that the effect of the finasteride is 
not instantaneous.  It takes many months for the Proscar to work and it does 
not cause a blockage.  Usually when a patient is on Proscar, he has an enlarged 
prostate.  Therefore, it is associated obstruction by the prostate rather than 
the medicine itself causing the prostate enlargement.  

 

IMPRESSION AND PLAN:  In any case, I have explained to him the 2 things that 
the patient is concerned about.  

1.  The urinary retention and inability to empty his bladder.  

2.  The prostate cancer.  

 

As far as the prostate cancer:  

1.  Many of the indications are consistent with prostate cancer; that is 
elevated PSA over 50.  

2.  Second, his prostate on the rectal exam is hard, very hard.  

3.  He has a bone scan that is positive for metastatic disease.

4.  He does have a CAT scan that showed metastatic disease in the area around 
the bladder and the prostate, retroperitoneal, and also osteoblastic metastatic 
disease that is usually consistent with prostate cancer.  

 

I told him if we were to make sure that, to have tissue pathology, a tissue 
diagnosis, we will have to do a transrectal ultrasound and ultrasound-guided 
biopsy of the prostate.  Usually we do that in the office and we could do that 
later on.  However, if he was to undergo a transurethral resection of the 
prostate to try to relieve any obstruction caused by the prostate, then maybe 
some of the tissue that we take out may have prostate cancer and that may 
preclude the need for a prostate biopsy.  However, if the cancer is in a 
peripheral zone; therefore, is not going to be around the urethra, where we 
resect; and therefore, even if we do a transurethral resection of the prostate, 
he may later on need to undergo a transrectal ultrasound and ultrasound-guided 
biopsy to demonstrate that he has cancer.  

 

The second problem that he was concerned is the inability to empty his bladder.
  I told him we already have him on the Flomax and the finasteride and these 
may help him to where he may not need to have surgery.  However, he kept on 
complaining that he is not able to empty his bladder and he cannot stay like 
that.  I told him second option would be to go in and do a transurethral 
resection of the prostate.  He said, we have done it 1-1/2 years ago and it did 
help him up until now and he did well for over these 1-1/2 years.  Therefore, 
he would like to have the second option done.  So I explained to him that I 
could do the transurethral resection of the prostate and do an internal 
urethrotomy under direct vision and hopefully that will help him empty his 
bladder.  But there is no mean this guarantee that he is going to empty his 
bladder completely.  I made it clear to him that in case the transurethral 
resection does not show any prostatic cancerous tissue.  He will still need to 
undergo a transrectal ultrasound and CT-guided biopsy to document the diagnosis 
of prostate cancer.  He did understand all of that.  I asked him if he has any 
questions or concerns to ask me and I answered every single question that he 
had and he is agreeable to proceed with the surgery tomorrow at 5:30 p.m.

 

 

Dictated By: EM RIGGS/EMILIA

DD:    02/12/2017 18:12:19

DT:    02/12/2017 19:24:08

Conf#: [id number]

DID#:  [id number]

CC: VALDEMAR COCHRAN MD;*EndCC*

MTDD

## 2017-02-12 NOTE — PDOCDIS
Discharge Instructions


CONDITION


Patient Condition:  Good





HOME CARE INSTRUCTIONS:


Special Diet:  Regular





ACTIVITY:








Activity Restrictions:   No Restrictions











FOLLOW UP/APPOINTMENTS


Appointments


Follow up with Urology as out-pt 


Follow up with PCP as out-pt











VALDEMAR COCHRAN MD Feb 12, 2017 11:49

## 2017-02-12 NOTE — PN
Date/Time of Note


Date/Time of Note


DATE: 2/12/17 


TIME: 12:18





Assessment/Plan


VTE Prophylaxis


VTE Prophylaxis Intervention:  SCD's





Lines/Catheters


IV Catheter Type (from [facility]):  Saline Lock


Urinary Cath still in place:  No





Assessment/Plan


Chief Complaint/Hosp Course


Assessment/Plan


1.  Urinary retention due to prostate cancer and urethral stricture, s/p 

dilatation, still with urinary retention, follow up with urology for possible 

TURP


2.  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 

follow-up oncology recommendations


3. Constipation, laxatives





Plan to discharge home tomorrow if cleared by urology and oncology


Problems:  





Subjective


24 Hr Interval Summary


Free Text/Dictation


Patient complains of having difficulty with urination


No nausea vomiting diarrhea


Denies of any abdominal pain





Exam/Review of Systems


Vital Signs


Vitals





 Vital Signs








  Date Time  Temp Pulse Resp B/P Pulse Ox O2 Delivery O2 Flow Rate FiO2


 


2/12/17 07:51 98.5 75 18 128/76 96   














 Intake and Output   


 


 2/11/17 2/11/17 2/12/17





 15:00 23:00 07:00


 


Intake Total  2660 ml 


 


Output Total  2010 ml 


 


Balance  650 ml 











Exam


General: The patient is well-developed, Not  in acute distress.


HEENT: Atraumatic, normocephalic. The pupils are equal and round .


 Neck: Supple with full range of motion. 


Chest: Normal expansion of the thorax during inspiration


Lungs: Clear to auscultation bilaterally


Heart: Normal S1-S2, Regular rhythm and rate.


Abdomen: Soft , nontender,  nondistended , bowel sounds are present.


Extremities: Normal to inspection, no edema no cyanosis


Neurologic: Normal mental status,The patient is awake,  alert and oriented .





Results


Result Diagram:  


2/8/17 0510                                                                    

            2/8/17 0510








Medications


Medications





 Current Medications


Sodium Chloride (NS) 1,000 ml @  0 mls/hr [id number] IV  Last administered on 2/9/ 17at 08:27; Admin Dose 75 MLS/HR;  Start 2/7/17 at 15:13


Ondansetron HCl (Zofran Inj) 4 mg Q6H  PRN IV NAUSEA AND/OR VOMITING;  Start 2/7 /17 at 15:30


Acetaminophen (Tylenol Tab) 650 mg Q6H  PRN PO PAIN LEVEL 1-3 OR FEVER;  Start 2 /7/17 at 15:30


Acetaminophen/ Hydrocodone Bitart (Norco (5/325)) 1 tab Q6H  PRN PO MODERATE 

PAIN LEVEL 4-6 Last administered on 2/10/17at 14:28; Admin Dose 1 TAB;  Start 2/ 7/17 at 15:30


Morphine Sulfate (morphine) 2 mg Q4H  PRN IV SEVERE PAIN LEVEL 7-10;  Start 2/7/ 17 at 15:30


Magnesium Hydroxide (Milk Of Mag) 30 ml DAILY  PRN PO CONSTIPATION Last 

administered on 2/11/17at 12:48; Admin Dose 30 ML;  Start 2/7/17 at 15:30


Bisacodyl (Dulcolax) 5 mg DAILY  PRN PO CONSTIPATION Last administered on 2/11/ 17at 22:25; Admin Dose 5 MG;  Start 2/7/17 at 15:30


Famotidine (Pepcid) 20 mg Q12 PO  Last administered on 2/12/17at 09:00; Admin 

Dose 20 MG;  Start 2/7/17 at 21:00


Lactulose (Enulose) 20 gm BID  PRN PO CONSTIPATION Last administered on 2/10/

17at 21:06; Admin Dose 20 GM;  Start 2/8/17 at 15:00


Tamsulosin HCl (Flomax) 0.4 mg HS PO  Last administered on 2/11/17at 20:35; 

Admin Dose 0.4 MG;  Start 2/10/17 at 21:00


Finasteride (Proscar) 5 mg DAILY PO  Last administered on 2/12/17at 10:52; 

Admin Dose 5 MG;  Start 2/10/17 at 19:00


Lorazepam (Ativan) 1 mg HS PO  Last administered on 2/11/17at 20:35; Admin Dose 

1 MG;  Start 2/10/17 at 22:57











VALDEMAR COCHRAN MD Feb 12, 2017 12:19

## 2017-02-13 NOTE — RADRPT
PROCEDURE:   XR Chest 1 View. 

 

CLINICAL INDICATION: Abnormal breath sounds, preop.

 

TECHNIQUE:   AP view of the chest were obtained. 

 

COMPARISON:   April 15, 2016 

 

FINDINGS:

The cardiomediastinal silhouette is within normal limits. The lungs are hyperexpanded. No consolidat
ions are identified.  No pneumothorax is seen.  Osseous structures are intact. 

 

IMPRESSION:

Hyperexpanded, clear lungs. 

 

 

RPTAT: AA

_____________________________________________ 

.Vinny Sanchez MD, MD           Date    Time 

Electronically viewed and signed by .Vinny Sanchez MD, MD on 02/13/2017 10:48 

 

D:  02/13/2017 10:48  T:  02/13/2017 10:48

.P/

## 2017-02-13 NOTE — OPR
DATE OF OPERATION:  02/13/2017

 

 

PREOPERATIVE DIAGNOSES:  

1.  Urinary retention.

2.  Urethral stricture.

3.  Adenocarcinoma of the prostate with metastasis.

 

POSTOPERATIVE DIAGNOSES:  

1.  Urinary retention.

2.  Urethral stricture.

3.  Adenocarcinoma of the prostate with metastasis, pending pathology report.

 

OPERATION PERFORMED:  Cystoscopy, internal urethrotomy under direct vision and transurethral resecti
on of the prostate.

 

TECHNIQUE:  The patient was brought to the operating room.  General anesthesia was induced.  The pat
ient was positioned in the lithotomy position.  Two grams of Ancef was given at the start of the pro
cedure.  Timeout was done.  The patient was identified by his name, birth date and the procedure.  O
nce the patient was prepped and draped in the usual sterile manner, the direct vision internal ureth
rotomy was done, and the patient did have a distal urethral stricture that was cut at the 12-o'clock
 position, and then we were able to advance the scope all the way into the bladder.  The bladder yovani
eared to be trabeculated.  There was no evidence of bladder tumor or ulceration.  However, the patie
nt's prostate was a little tight, and there was some growth of prostatic tissue in the area of the l
ateral lobes toward the apical area and also anteriorly.  Once the internal urethrotomy was done, th
e scope was removed, and then I re-introduced the resectoscope sheath under direct vision.  Once in 
the bladder, the resection of the prostate was done, again between the 5- and 7-o'clock position.  T
hen I did resect the left lateral lobe, then the right lateral lobe and finally the anterior lobe.  
All the apical tissue was resected.  The ureteral orifices were visualized and were intact all the t
lake[place], and the external sphincter was always also identified and safeguarded.  At the end of the proce
dure, all the prostatic chips were evacuated.  Good hemostasis was obtained, and the patient had a 2
4-Spanish 3-way Diego catheter inserted, the balloon inflated with 50 mL of sterile water.  Continuou
s bladder irrigation was started in the operating room, and the patient was transferred to recovery 
room in stable and satisfactory condition.

 

 

Dictated By: EM RIGGS/EMILIA

DD:    02/13/2017 19:21:17

DT:    02/13/2017 23:14:01

Conf#: [id number]

DID#:  [id number]

## 2017-02-13 NOTE — PN
Date/Time of Note


Date/Time of Note


DATE: 2/13/17 


TIME: 10:05





Assessment/Plan


VTE Prophylaxis


VTE Prophylaxis Intervention:  contraindicated


VTE Contraindication Reason:  blood coagulation disorder, bleeding (Risk)





Lines/Catheters


IV Catheter Type (from Nrsg):  Saline Lock


Urinary Cath still in place:  Yes


Reason Cath still needed:  urinary retention





Assessment/Plan


Chief Complaint/Hosp Course


Hospitalist coverage:





S:


2/13- Events noted





O: vss





PE


No pallor adenopathy


Reg


CTAB


Bs+, nt, nd, no R/R/G. No cvta


No edema





A/P:


1.  Obstructive uropathy, stable. Cysto/TURP/urethrotomy/ possible prostate bx.


-Low periop risk, may proceed from medical standpoint.  No Lovenox for now.


2.  Possible metastatic malignancy; prostate vs bladder in origin


3.  Urinary retention


4.  Ho BPH/TURP


Problems:  





Exam/Review of Systems


Vital Signs


Vitals





 Vital Signs








  Date Time  Temp Pulse Resp B/P Pulse Ox O2 Delivery O2 Flow Rate FiO2


 


2/13/17 08:04 97.4 62 18 135/75 92   


 


2/12/17 19:45      Room Air  














 Intake and Output   


 


 2/12/17 2/12/17 2/13/17





 15:00 23:00 07:00


 


Intake Total  960 ml 2400 ml


 


Output Total  1560 ml 2200 ml


 


Balance  -600 ml 200 ml











Medications


Medications





 Current Medications


Sodium Chloride (NS) 1,000 ml @  0 mls/hr [id number] IV  Last administered on 2/9/ 17at 08:27; Admin Dose 75 MLS/HR;  Start 2/7/17 at 15:13


Ondansetron HCl (Zofran Inj) 4 mg Q6H  PRN IV NAUSEA AND/OR VOMITING;  Start 2/7 /17 at 15:30


Acetaminophen (Tylenol Tab) 650 mg Q6H  PRN PO PAIN LEVEL 1-3 OR FEVER;  Start 2 /7/17 at 15:30


Acetaminophen/ Hydrocodone Bitart (Norco (5/325)) 1 tab Q6H  PRN PO MODERATE 

PAIN LEVEL 4-6 Last administered on 2/12/17at 22:23; Admin Dose 1 TAB;  Start 2/ 7/17 at 15:30


Morphine Sulfate (morphine) 2 mg Q4H  PRN IV SEVERE PAIN LEVEL 7-10;  Start 2/7/ 17 at 15:30


Magnesium Hydroxide (Milk Of Mag) 30 ml DAILY  PRN PO CONSTIPATION Last 

administered on 2/11/17at 12:48; Admin Dose 30 ML;  Start 2/7/17 at 15:30


Famotidine (Pepcid) 20 mg Q12 PO  Last administered on 2/13/17at 09:16; Admin 

Dose 20 MG;  Start 2/7/17 at 21:00


Lactulose (Enulose) 20 gm BID  PRN PO CONSTIPATION Last administered on 2/12/ 17at 20:41; Admin Dose 20 GM;  Start 2/8/17 at 15:00


Tamsulosin HCl (Flomax) 0.4 mg HS PO  Last administered on 2/12/17at 20:41; 

Admin Dose 0.4 MG;  Start 2/10/17 at 21:00


Finasteride (Proscar) 5 mg DAILY PO  Last administered on 2/13/17at 09:16; 

Admin Dose 5 MG;  Start 2/10/17 at 19:00


Lorazepam (Ativan) 1 mg HS PO  Last administered on 2/12/17at 20:41; Admin Dose 

1 MG;  Start 2/10/17 at 22:57


Bisacodyl (Dulcolax Supp) 10 mg DAILY  PRN DC CONSTIPATION Last administered on 

2/12/17at 22:23; Admin Dose 10 MG;  Start 2/12/17 at 22:00











LEDA MORGAN MD Feb 13, 2017 10:09

## 2017-02-14 NOTE — PN
DATE:  02/14/2017

 

 

SUBJECTIVE:  Status post transurethral resection of prostate and internal urethrotomy under direct v
ision.  The patient is postoperative day 1.  The patient is comfortable and denies having any pain a
nd he feels much better.

 

OBJECTIVE:  

VITAL SIGNS:  His temperature is 98.2, blood pressure 120/68, pulse 66, respiration is 16.

ABDOMEN:  Soft and the Diego catheter is draining clear pink urine with the bladder irrigation.

 

LABORATORY DATA:  CBC shows a white count of 7.0,  hemoglobin 12.3, hematocrit 35.8.  The BUN is 16,
 creatinine 0.89.

 

IMPRESSION AND PLAN:  Postoperative day 1 post transurethral resection of prostate.  The patient doi
ng well.  We will down the bladder irrigation and encourage him to drink more fluids or he could amb
ulate and go to the bathroom.

 

 

Dictated By: EM RIGGS/EMILIA

DD:    02/14/2017 08:25:10

DT:    02/14/2017 09:38:18

Conf#: [id number]

DID#:  [id number]

## 2017-02-14 NOTE — RADRPT
Vent Rate: 79 bpm

RR Interval: 0 msec

CA Interval: 128 msec

QRS Duration: 82 msec

QT Interval: 368 msec

QTC Interval: 421 msec

P-R-T Axis: 57 - 32 - 35 degrees

 

 Normal sinus rhythm

 Normal ECG

 

Electronically Signed By: Gray Mckoy

[id number]

## 2017-02-15 NOTE — PN
Date/Time of Note


Date/Time of Note


DATE: 2/15/17 


TIME: 11:00





Assessment/Plan


VTE Prophylaxis


VTE Prophylaxis Intervention:  contraindicated (Hematuria)





Lines/Catheters


IV Catheter Type (from Nrsg):  Saline Lock


Urinary Cath still in place:  Yes


Reason Cath still needed:  urinary retention





Assessment/Plan


Chief Complaint/Hosp Course


S: 2/14-progressing well.  Hematuria improved.  No dyspnea fever.  Pain stable.





O: vss





PE


No pallor


Reg


ctab


Bs+, nt, nd, no R/R/G. No cvta


No edema





A/P:


1.  Obstructive uropathy, stable. Sp Cysto/TURP/urethrotomy/ prostate bx.


2.  Metastatic prostate Ca


3.  Urinary retention; sp estrada; +/- leg bag estrada on dc.


4.  Ho bph/turp


Problems:  





Exam/Review of Systems


Vital Signs


Vitals





 Vital Signs








  Date Time  Temp Pulse Resp B/P Pulse Ox O2 Delivery O2 Flow Rate FiO2


 


2/15/17 07:58 98.5 94 20 111/68 93   


 


2/13/17 21:30      Nasal Cannula 2.0 














 Intake and Output   


 


 2/14/17 2/14/17 2/15/17





 15:00 23:00 07:00


 


Intake Total   4000 ml


 


Output Total  1800 ml 1100 ml


 


Balance  -1800 ml 2900 ml











Results


Result Diagram:  


2/14/17 0455                                                                   

             2/14/17 3744





Results 24 hrs





Laboratory Tests








Test


  2/14/17


18:54


 


Anion Gap 15  


 


Blood Urea Nitrogen 13  


 


Calcium Level 8.8  


 


Carbon Dioxide Level 31  


 


Chloride Level 91  #L


 


Creatinine 0.99  


 


Glucose Level 135  


 


Potassium Level 3.8  


 


Sodium Level 133  L











Medications


Medications





 Current Medications


Sodium Chloride (NS) 1,000 ml @  0 mls/hr [id number] IV  Last administered on 2/9/ 17at 08:27; Admin Dose 75 MLS/HR;  Start 2/7/17 at 15:13


Ondansetron HCl (Zofran Inj) 4 mg Q6H  PRN IV NAUSEA AND/OR VOMITING;  Start 2/7 /17 at 15:30


Acetaminophen (Tylenol Tab) 650 mg Q6H  PRN PO PAIN LEVEL 1-3 OR FEVER;  Start 2 /7/17 at 15:30


Acetaminophen/ Hydrocodone Bitart (Norco (5/325)) 1 tab Q6H  PRN PO MODERATE 

PAIN LEVEL 4-6 Last administered on 2/13/17at 11:28; Admin Dose 1 TAB;  Start 2/ 7/17 at 15:30


Morphine Sulfate (morphine) 2 mg Q4H  PRN IV SEVERE PAIN LEVEL 7-10 Last 

administered on 2/15/17at 08:33; Admin Dose 2 MG;  Start 2/7/17 at 15:30


Magnesium Hydroxide (Milk Of Mag) 30 ml DAILY  PRN PO CONSTIPATION Last 

administered on 2/11/17at 12:48; Admin Dose 30 ML;  Start 2/7/17 at 15:30


Lactulose (Enulose) 20 gm BID  PRN PO CONSTIPATION Last administered on 2/14/ 17at 20:03; Admin Dose 20 GM;  Start 2/8/17 at 15:00


Tamsulosin HCl (Flomax) 0.4 mg HS PO  Last administered on 2/14/17at 20:03; 

Admin Dose 0.4 MG;  Start 2/10/17 at 21:00


Finasteride (Proscar) 5 mg DAILY PO  Last administered on 2/15/17at 08:32; 

Admin Dose 5 MG;  Start 2/10/17 at 19:00


Lorazepam (Ativan) 1 mg HS PO  Last administered on 2/14/17at 20:03; Admin Dose 

1 MG;  Start 2/10/17 at 22:57


Bisacodyl (Dulcolax Supp) 10 mg DAILY  PRN PA CONSTIPATION Last administered on 

2/14/17at 08:20; Admin Dose 10 MG;  Start 2/12/17 at 22:00


Famotidine (Pepcid) 20 mg DAILY PO  Last administered on 2/15/17at 08:32; Admin 

Dose 20 MG;  Start 2/14/17 at 09:00











LEDA MORGAN MD Feb 15, 2017 11:03

## 2017-02-15 NOTE — PN
DATE:  02/15/2017

 

 

SUBJECTIVE:  This patient is status post transurethral resection of the prostate.  He is feeling com
fortable and denies having any pain.

 

OBJECTIVE:  

VITAL SIGNS:  His temperature is 98.6, respiration is 18, pulse is 96, blood pressure 131/88.

ABDOMEN:  Soft.  

GENITOURINARY:  The urine from the catheter is clear with the irrigation call running slow.

 

LABORATORY DATA:  His CBC shows a white count of 7.0, hemoglobin 12.3.  The BUN is 13, creatinine 0.
99.

 

PLAN:  To stop the irrigation and encourage him to drink a lot of water, and if the urine remains re
asonably clear, then he may be able to go home tomorrow.

 

 

Dictated By: EM RIGGS/EMILIA

DD:    02/15/2017 07:52:31

DT:    02/15/2017 08:29:00

Conf#: [id number]

DID#:  [id number]

## 2017-02-16 NOTE — PN
DATE:  02/16/2017

 

 

SUBJECTIVE:  The patient is status post transurethral resection of the prostate and a history of uri
nary retention, metastatic prostate cancer.  The patient at the present feels fine, has no pain, and
 has clear urine coming out of the Diego catheter.

 

OBJECTIVE:

VITAL SIGNS:  His temperature is 99.5, pulse is 85.  The blood pressure 121/66.

ABDOMEN:  Soft.  Diego catheter draining clear urine.

 

LABORATORY DATA:  The CBC shows a white count of 7.0, hemoglobin 12.1, hematocrit 34.8, BUN is 11, c
reatinine 0.95.  The pathology report came back as adenocarcinoma of the prostate with a Barnwell sco
re of 5+5=10.

 

IMPRESSION:  Adenocarcinoma of the prostate with metastatic high-grade Barnwell score of 10, 5+5.

 

PLAN:  The patient could go home today with the Diego catheter and the leg bag and then see in the o
ffice on Tuesday and remove the Diego catheter.  The patient was already started on Lupron Depot, an
d I will request a followup from his insurance to give him regular Lupron Depot for his prostate can
cer.

 

 

Dictated By: EM EID MD

 

BB/EMILIA

DD:    02/16/2017 08:24:15

DT:    02/16/2017 09:11:21

Conf#: [id number]

DID#:  [id number]

## 2017-02-16 NOTE — DS
DATE OF ADMISSION: 02/07/2017

DATE OF DISCHARGE: 02/16/2017

 

PRIMARY CARE PHYSICIAN:  Dr. Yamil Pike.

 

CONSULTANT:  Dr. Antonio Carr.

 

DIAGNOSIS ON ADMISSION:  Obstructive uropathy.

 

DIAGNOSES ON DISCHARGE:

1.  Obstructive uropathy.

2.  Metastatic prostate cancer.

3.  Urinary retention.

4.  History of BPH TURP.

5.  Pre-diabetes.

 

HOSPITAL COURSE:  This is an unfortunate 62-year-old gentleman admitted with obstructive uropathy, h
ad abnormal imaging concerning for malignancy.  Between malignancy and obstruction, the patient is o
ffered surgery.  He underwent cystoscopy, transurethral resection of prostate, urethrotomy and prost
ate biopsy.  Preliminary results appear to state a prostate cancer and this appears to be metastatic
.  Postoperative hospital course was uncomplicated, patient is tolerating diet, ambulating, pain is 
controlled and he is fit for discharge.  He will be going home with a leg bag Diego with followup.  
He is to see oncology and urology in followup.  He has been started on Lupron.

 

PROCEDURE:  Cystoscopy, internal urethrotomy under direct vision and transurethral resection of pros
burnett.

 

Chest x-ray:  No acute process, hyperextended lung.  

A bone scan was done showing multiple air abnormal foci of intense and increased activity in the axi
al and appendicular skeleton consistent with multiple skeletal metastases,  DJD of the left knee.  T
here is uptake in the right shoulder and rib cage bilaterally, spine, sacrum, pelvic bones bilateral
ly distal femurs bilaterally and the left proximal femur.   CAT scan abdomen and pelvis without cont
rast was read as an enlarged prostate, posterior bulging toward the left, gallbladder and urinary wa
ll thickening, retroperitoneal and left iliac lymphadenopathy along with solid nodules in the perire
ctal and ischiorectal fat,  small nonobstructive left renal calculi noted, small hiatal hernia.  Uri
ne culture negative.  Pathology preliminary report shows Aide grade 5+5 prostate adenocarcinoma, 
20% of the submitted tissue.

 

 INR 1.3.  CMP essentially unremarkable except for AST of 49, alkaline phosphatase of 223.  LDH of 8
 and 36, protein of 6, albumin of 3.  TSH of 1.2.  CEA of 4.5.  Free T4 of 1, LDH of 836.  LFTs okay
,  total cholesterol 158, triglyceride ____, LDL 99, HDL 37.  PSA was 54.5, lipase normal.  A1c of 5
.8,  Sodium 141, potassium 4, chloride 102, bicarbonate 31, BUN of 11, creatinine 0.9, glucose of 10
2.  White cell count of 7, hemoglobin and hematocrit of 12 and 34, platelets of 259.

 

DISCHARGE PLAN:  Home.  Follow up with primary in 1 week.  Dr. Armas in 1 to 2 weeks.

Dr. Carr next Tuesday.

 

DIET:  Regular.

 

ACTIVITY:  No heavy lifting.

 

DURABLE MEDICAL EQUIPMENT:  Diego.

 

CODE STATUS:  FULL.

 

CONDITION:  Stable.

 

BARRIERS TO DISCHARGE:  None.

 

PENDING TESTS:  Final pathology [id number].

 

FUNCTIONAL STATUS:  Patient awake, alert, agrees with plan of care.

 

REASON FOR ADMISSION:  Urinary obstruction.

 

ALLERGIES:  CIPROFLOXACIN.

 

MEDICATIONS:

1.  Tylenol as needed.

2.  Norco 5 one every 4 hours as needed for pain.

3.   Senna-S 2 tablets nightly.

4.   Proscar 5 mg daily.

5.  Flomax 0.4 at bedtime daily.

 

 

Dictated By: LEDA MORGAN MD

 

AC/NTS

DD:    02/16/2017 10:32:17

DT:    02/16/2017 12:09:45

Conf#: [id number]

DID#:  [id number]

CC: VALDEMAR COCHRAN MD;*EndCC*

## 2017-02-16 NOTE — PDOCDIS
Discharge Instructions


DIAGNOSIS


Discharge Diagnosis:  urinary retention





CONDITION


Patient Condition:  Stable





HOME CARE INSTRUCTIONS:


Special Diet:  REGULAR DIET





ACTIVITY:








Activity Restrictions:   No Restrictions





 Slowly Increase Activity











FOLLOW UP/APPOINTMENTS


Appointments


Dr Bruno nails.


Dr Armas -1-2wks


PCP Dr Pike -1-2wLEDA Negron MD Feb 16, 2017 10:20

## 2018-05-10 ENCOUNTER — HOSPITAL ENCOUNTER (EMERGENCY)
Dept: HOSPITAL 91 - FTE | Age: 63
Discharge: HOME | End: 2018-05-10
Payer: COMMERCIAL

## 2018-05-10 ENCOUNTER — HOSPITAL ENCOUNTER (EMERGENCY)
Age: 63
Discharge: HOME | End: 2018-05-10
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
## 2018-05-10 DIAGNOSIS — Z85.46: ICD-10-CM
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
## 2018-05-10 DIAGNOSIS — S01.511A: Primary | ICD-10-CM

## 2018-05-10 DIAGNOSIS — Z23: ICD-10-CM

## 2018-05-10 PROCEDURE — 12011 RPR F/E/E/N/L/M 2.5 CM/<: CPT

## 2018-05-10 PROCEDURE — 90471 IMMUNIZATION ADMIN: CPT

## 2018-05-10 PROCEDURE — 99284 EMERGENCY DEPT VISIT MOD MDM: CPT

## 2018-05-10 PROCEDURE — 90715 TDAP VACCINE 7 YRS/> IM: CPT

## 2018-05-10 RX ADMIN — CLOSTRIDIUM TETANI TOXOID ANTIGEN (FORMALDEHYDE INACTIVATED), CORYNEBACTERIUM DIPHTHERIAE TOXOID ANTIGEN (FORMALDEHYDE INACTIVATED), BORDETELLA PERTUSSIS TOXOID ANTIGEN (GLUTARALDEHYDE INACTIVATED), BORDETELLA PERTUSSIS FILAMENTOUS HEMAGGLUTININ ANTIGEN (FORMALDEHYDE INACTIVATED), BORDETELLA PERTUSSIS PERTACTIN ANTIGEN, AND BORDETELLA PERTUSSIS FIMBRIAE 2/3 ANTIGEN 1 ML: 5; 2; 2.5; 5; 3; 5 INJECTION, SUSPENSION INTRAMUSCULAR at 17:28

## 2018-05-10 RX ADMIN — VANCOMYCIN 1 ML: 1 INJECTION, SOLUTION INTRAVENOUS at 17:44

## 2018-05-10 RX ADMIN — LIDOCAINE HYDROCHLORIDE 1 ML: 20 INJECTION, SOLUTION INFILTRATION; PERINEURAL at 17:35

## 2018-05-10 RX ADMIN — IBUPROFEN 1 MG: 800 TABLET, FILM COATED ORAL at 17:23

## 2018-05-10 RX ADMIN — ACETAMINOPHEN 1 MG: 325 TABLET, FILM COATED ORAL at 17:26

## 2019-08-10 ENCOUNTER — HOSPITAL ENCOUNTER (EMERGENCY)
Dept: HOSPITAL 91 - FTE | Age: 64
LOS: 1 days | Discharge: HOME | End: 2019-08-11
Payer: MEDICARE

## 2019-08-10 ENCOUNTER — HOSPITAL ENCOUNTER (EMERGENCY)
Dept: HOSPITAL 10 - FTE | Age: 64
LOS: 1 days | Discharge: HOME | End: 2019-08-11
Payer: MEDICARE

## 2019-08-10 VITALS
WEIGHT: 176.15 LBS | HEIGHT: 66 IN | BODY MASS INDEX: 28.31 KG/M2 | BODY MASS INDEX: 28.31 KG/M2 | HEIGHT: 66 IN | WEIGHT: 176.15 LBS

## 2019-08-10 DIAGNOSIS — R11.10: Primary | ICD-10-CM

## 2019-08-10 DIAGNOSIS — Z85.46: ICD-10-CM

## 2019-08-10 PROCEDURE — 99283 EMERGENCY DEPT VISIT LOW MDM: CPT

## 2019-08-10 RX ADMIN — ONDANSETRON 1 MG: 4 TABLET, ORALLY DISINTEGRATING ORAL at 23:58

## 2019-08-10 RX ADMIN — ALUMINUM HYDROXIDE, MAGNESIUM HYDROXIDE, DIMETHICONE 1 ML: 200; 200; 20 SUSPENSION ORAL at 23:58

## 2019-08-11 VITALS — RESPIRATION RATE: 14 BRPM | DIASTOLIC BLOOD PRESSURE: 79 MMHG | HEART RATE: 66 BPM | SYSTOLIC BLOOD PRESSURE: 160 MMHG

## 2019-08-11 NOTE — ERD
ER Documentation


Chief Complaint


Chief Complaint





vomiting today; s/p CT with IV/PO as outpt; after PO contrast started vomit





HPI


64-year-old male with past medical history of stage IV prostate cancer who 


presents with complaint of vomiting after having a CT with IV and p.o. contrast 


earlier this morning.  Patient was getting CT as part of work-up to evaluate 


distant spread of metastasis.  Patient received CT at around 10:30 AM this 


morning.  He went home had some food and then had some nonbilious nonbloody 


vomiting about 4-6 times.  He otherwise denies fevers, chills, shortness of 


breath or dyspnea, tongue swelling, headache or dizziness, abdominal pain, rash 


or any other concerning symptoms.





ROS


All systems reviewed and are negative except as per history of present illness.





Medications


Home Meds


Active Scripts


Famotidine* (Pepcid*) 20 Mg Tablet, 20 MG PO BID for 4 Days, TAB


   Prov:RITIKA COLE PA-C         8/10/19


Ondansetron (Ondansetron Odt) 4 Mg Tab.rapdis, 4 MG PO Q6H PRN for NAUSEA AND/OR


VOMITING, #10 TAB


   Prov:RITIKA COLE PA-C         8/10/19


Hydrocodone/Acetaminophen (Norco 5-325 Tablet) 1 Each Tablet, 1 TAB PO Q6H PRN 


for PAIN, #10 TAB


   Prov:DUSTIN BYRNE PA-C         5/10/18


Amoxicillin/Potassium Clav (Amox-Clav 875-125 mg Tablet) 875-125 mg Tab, 1 TAB 


PO BID for 10 Days, #20 TAB


   Prov:DUSTIN BYRNE PA-C         5/10/18


Sennosides/Docusate Sodium (Senna Plus Tablet) 1 Each Tablet, 2 TAB PO HS for 7 


Days, #14 TAB 3 Refills


   Prov:LEDA MORGAN MD         2/16/17


Acetaminophen* (Tylenol*) 325 Mg Tablet, 650 MG PO Q6H PRN for PAIN LEVEL 1-3 OR


FEVER for 1 Day, #1 TAB


   Prov:LEDA MORGAN MD         2/16/17


Hydrocodone/Acetaminophen (Norco 5-325 Tablet) 1 Each Tablet, 1 EACH PO Q6H, #1 


TAB


   Prov:VALDEMAR COCHRAN MD         2/12/17


Tamsulosin Hcl* (Flomax*) 0.4 Mg Cap.er.24h, 0.4 MG PO HS, #30 CAP


   Prov:VALDEMAR COCHRAN MD         2/12/17


Finasteride* (Finasteride*) 5 Mg Tablet, 5 MG PO DAILY for 30 Days, TAB


   Prov:VALDEMAR COCHRAN MD         2/12/17





Allergies


Allergies:  


Coded Allergies:  


     No Known Allergy (Unverified , 5/10/18)





PMhx/Soc


Anesthesia Reaction:  No


Hx Neurological Disorder:  No


Hx Respiratory Disorders:  No


Hx Cardiac Disorders:  No


Hx Psychiatric Problems:  No


Hx Miscellaneous Medical Probl:  No


Hx Alcohol Use:  No


Hx Substance Use:  No


Hx Tobacco Use:  No


Smoking Status:  Never smoker





FmHx


Family History:  No diabetes, No coronary disease, No other





Physical Exam


Vitals





Vital Signs


  Date      Temp  Pulse  Resp  B/P (MAP)   Pulse Ox  O2          O2 Flow    FiO2


Time                                                 Delivery    Rate


   8/10/19  97.1     84    18      182/92        99


     23:27                          (122)





Physical Exam


I have reviewed the triage vital signs.


Const: Well nourished, well developed, appears stated age


Eyes: PERRL, no conjunctival injection


HENT: NCAT, Neck supple without meningismus 


CV: RRR, Warm, well-perfused extremities


RESP: CTAB, Unlabored respiratory effort


GI: soft, non-tender, non-distended, no masses


MSK: No gross deformities appreciated


Skin: Warm, dry. No rashes


Neuro: grossly non focal


Psych: Appropriate mood and affect.


Results 24 hrs





Current Medications


 Medications
   Dose
          Sig/Ruddy
       Start Time
   Status  Last


 (Trade)       Ordered        Route
 PRN     Stop Time              Admin
Dose


                              Reason                                Admin


 Ondansetron    4 mg           ONCE  STAT
    8/10/19       DC       



HCl
  (Zofran                 ODT
           23:54



Odt)                                         8/10/19 23:55


                40 ml          ONCE  ONCE
    8/11/19                



Miscellaneous                 PO
            00:00




 Medication
                                8/11/19 00:01


 (Gi Cocktail


(2))








Procedures/MDM


64-year-old male presents with vomiting after eating IV and p.o. contrast for CT


this morning.  I have low suspicion for any acute emergent process warranting 


further emergent care or work-up.  Patient's vital signs are stable.  Patient 


does not have any signs or symptoms of impending airway collapse or any symptoms


that might be considered severe allergic reactions to set contrast.  He is 


speaking full sentences and has a reassuring exam.  We will treat his vomiting 


with Zofran.  Strict return precautions explained in detail.





DISPOSITION PLAN:


We discussed follow up with the patient's primary care doctor within 24 to 48 


hours. Patient counseled regarding my diagnostic impression and care plan. Prior


to discharge all questions answered. Pt agrees with treatment plan and underst


ands strict return precautions. Precautionary instructions provided including 


instructions to return to the ER if not improving or for any worsening or 


changing symptoms or concerns.








Disclaimer: Inadvertent spelling and grammatical errors are likely due to 


EHR/dictation software use and do not reflect on the overall quality of patient 


care. Also, please note that the electronic time recorded on this note does not 


necessarily reflect the actual time of the patient encounter.





Departure


Diagnosis:  


   Primary Impression:  


   Vomiting


Condition:  Stable


Patient Instructions:  Vomiting (6Y-Adult)


Referrals:  


BEATRIZ HERNANDEZ (PCP)





Additional Instructions:  


Call your primary care doctor TOMORROW for an appointment during the next 2-3 


days.See the doctor sooner or return here if your condition worsens before your 


appointment time.





Developed concerning symptoms such as shortness of breath, chest pain, 


lightheadedness or dizziness return to emergency room for further evaluation.











RITIKA COLE PA-C             Aug 11, 2019 00:05

## 2024-05-24 NOTE — CONS
DATE OF ADMISSION: 02/07/2017

DATE OF CONSULTATION:  02/07/2017

 

 

 

REQUESTING PHYSICIAN:  Dr. Cochran

 

HISTORY OF PRESENT ILLNESS:  This is a 62-year-old male who is known to me from before.  He presente
d to the emergency room today complaining of difficulty voiding.  The patient stated that he has bee
n trying to urinate and he has been urinating blood and he has abdominal pain.  Attempt by the emerg
ency room physician and staff to insert a Diego catheter was not successful.  Therefore, I was holly
d and urology consultation was requested.  The patient is known to have a history of urethral strict
ure that was dilated in the past, also history of urinary retention.  He underwent transurethral res
ection of prostate in 2015.  The pathology report on that resection was benign.  There was no cancer
 in the prostate resected at that time.  The patient underwent a CT scan of the abdomen and pelvis t
elfego and that showed a distended urinary bladder and pelvic adenopathy, most likely consistent with 
metastatic prostate cancer.

 

PAST SURGICAL HISTORY:  Includes history of left knee surgery.

 

ALLERGIES:  THE PATIENT IS ALLERGIC TO CIPRO.

 

MEDICATIONS:  He is on include:  

1.  Pepcid.

2.  Zofran p.r.n.

3.  Norco.

4.  Milk of magnesia.

5.  Dulcolax.

 

PHYSICAL EXAMINATION:

GENERAL:  Reveals an elderly male.  He weighs 83.6 kg.  He is 67 inches tall.

VITAL SIGNS:  His temperature is 98.8, pulse is 80, respirations 22, blood pressure 163/90.

HEAD AND NECK:  Unremarkable.

ABDOMEN:  Suprapubic tenderness.  The bladder is distended.

GENITALIA:  External genitalia are normal.

RECTAL:  Revealed a hard prostate.

EXTREMITIES:  Reveal no edema.

 

LABORATORY DATA:  CBC shows a white count of 10.1, hemoglobin 14.3, hematocrit 42.3.  BUN is 24, cre
atinine 0.95.  Electrolytes are normal.  PSA 54.5.  The CT scan of the abdomen and pelvis again repo
rted as prostate significantly enlarged demonstrating abnormal contour bulging posteriorly on the le
ft side.  The bladder wall thickening.  There is finding suggestive of prostate cancer, though synch
ronous neoplasm of the urinary bladder is also possible, but from the history on this patient, this 
most likely prostate cancer.  Retroperitoneal and left iliac lymphadenopathy is identified along wit
h solid nodule in the perirectal and ischiorectal fat, all of which are new when compared to prior C
T scan.  In addition, multiple new osteosclerotic lesions are identified.  These findings are consis
tent with new metastatic disease, most likely prostate.  His urinary bladder is significantly disten
ded and a small nonobstructive left renal calculus is noted without hydronephrosis.

 

IMPRESSION:  

1.  Urinary retention.  

2.  Metastatic adenocarcinoma of the prostate.  

3.  Urethral stricture.

 

PLAN:  I did do urethral dilatation from an 8-Armenian to 20 Armenian, then inserted a 16-Armenian coude c
atheter.  Over 500 mL of clear urine drained out.  Urine was sent for culture and sensitivity.  The 
Diego will be kept in for a couple of days.  The patient will be started on Lupron Depot 7.5 mg IM e
very month.  In a couple of days, we should take the catheter out and see if he is able to urinate o
n his own.  I will follow his urological problem with you.  I do thank you for allowing me to help i
n his care.

 

 

Dictated By: EM EID MD

 

BB/EMILIA

DD:    02/07/2017 20:59:43

DT:    02/07/2017 22:21:44

Conf#: [id number]

DID#:  [id number]

CC: VALDEMAR COCHRAN MD;*EndCC*
Date/Time of Note


Date/Time of Note


DATE: 2/12/17 


TIME: 11:19





Assessment/Plan


Assessment/Plan


Chief Complaint/Hosp Course


63 yo with elevated PSA and diffuse bone mets with high suspicion for prostate 

cancer. Pt has been started on Lupron in house. At this point I need to confer 

with the other consultants to see if patient needs a biopsy to prove his 

diagnosis. 








 


-discuss possibility of biopsy as this could be another cancer such as bladder 

ca


-Continue the finasteride, tamsulosin for BPH symptoms


-cont Lupron Depot given high probability of prostate cancer


Problems:  





Consultation Date/Type/Reason


Admit Date/Time


Feb 7, 2017 at 14:11


Date of Consultation:  Feb 12, 2017


Type of Consultation:  Oncology


Reason for Consultation


Prostate Cancer w mets


Referring Provider:  VALDEMAR COCHRAN MD





Hx of Present Illness


 62-year-old male with past medical history of benign prostatic hypertrophy 

status post TURP in 2015, who presented to emergency room with chief complaint 

of lower back pain, abdominal pain and painful urination with associated 

hematuria for the past 3 days. Of note pathology from that TURP did not 

demonstrate prostate cancer. 


 In the emergency room, the patient was noticed to have elevated AST and 

elevated alkaline phosphatase.  The patient underwent a CT scan of the abdomen 

and pelvis that showed significantly enlarged prostate with adjacent urinary 

bladder wall thickening. The CT also revealed retroperitoneal and left iliac 

lymphadenopathy along with solid nodules in the perirectal and ischiorectal 

fat.  The CT also revealed multiple osteosclerotic lesions.  Bone scan was done 

which revealed numerous abnormal focal areas of intensely increased tracer 

activity are seen in the right shoulder, rib cages bilaterally, spine, sacrum, 

pelvic bones bilaterally, distal humerus bilaterally, and left proximal femur.  

PSA was drawn which was noted to be elevated at 54. He is now being followed by 

urology and is being treated for metastatic prostate cancer given the diffuse 

bone lesions and elevated PSA. He was given his first dose of Lupron.


Subjective hx not possible:  other (bone pain)


Constitutional:  no complaints


Eyes:  no complaints


ENT:  no complaints


Respiratory:  no complaints


Cardiovascular:  no complaints


Gastrointestinal:  no complaints


Genitourinary:  no complaints


Musculoskeletal:  back pain, bone/joint pain


Skin:  no complaints


Neurologic:  no complaints


Psychological:  nl mood/affect, no complaints





Past Medical History


benign prostatic hypertrophy status post TURP in 2015





Family History


Significant Family History:  no pertinent family hx





Social History


Alcohol Use:  none


Smoking Status:  Never smoker


Drug Use:  none





Exam/Review of Systems


Vital Signs


Vitals





 Vital Signs








  Date Time  Temp Pulse Resp B/P Pulse Ox O2 Delivery O2 Flow Rate FiO2


 


2/12/17 07:51 98.5 75 18 128/76 96   














 Intake and Output   


 


 2/11/17 2/11/17 2/12/17





 15:00 23:00 07:00


 


Intake Total  2660 ml 


 


Output Total  2010 ml 


 


Balance  650 ml 











Exam


Constitutional:  alert, oriented


Psych:  nl mood/affect, no complaints


Head:  normocephalic


Eyes:  nl conjunctiva


ENMT:  nl external ears & nose, nl lips & teeth, nl nasal mucosa & septum


Neck:  non-tender, supple


Respiratory:  clear to auscultation, normal air movement


Cardiovascular:  nl pulses, regular rate and rhythm


Gastrointestinal:  soft


Musculoskeletal:  nl extremities to inspection, nl gait and stance


Extremities:  normal pulses





Results


Result Diagram:  


2/8/17 0510 2/8/17 0510








Medications


Medications





 Current Medications


Sodium Chloride (NS) 1,000 ml @  0 mls/hr [id number] IV  Last administered on 2/9/ 17at 08:27; Admin Dose 75 MLS/HR;  Start 2/7/17 at 15:13


Ondansetron HCl (Zofran Inj) 4 mg Q6H  PRN IV NAUSEA AND/OR VOMITING;  Start 2/7 /17 at 15:30


Acetaminophen (Tylenol Tab) 650 mg Q6H  PRN PO PAIN LEVEL 1-3 OR FEVER;  Start 2 /7/17 at 15:30


Acetaminophen/ Hydrocodone Bitart (Norco (5/325)) 1 tab Q6H  PRN PO MODERATE 

PAIN LEVEL 4-6 Last administered on 2/10/17at 14:28; Admin Dose 1 TAB;  Start 2/ 7/17 at 15:30


Morphine Sulfate (morphine) 2 mg Q4H  PRN IV SEVERE PAIN LEVEL 7-10;  Start 2/7/ 17 at 15:30


Magnesium Hydroxide (Milk Of Mag) 30 ml DAILY  PRN PO CONSTIPATION Last 

administered on 2/11/17at 12:48; Admin Dose 30 ML;  Start 2/7/17 at 15:30


Bisacodyl (Dulcolax) 5 mg DAILY  PRN PO CONSTIPATION Last administered on 2/11/ 17at 22:25; Admin Dose 5 MG;  Start 2/7/17 at 15:30


Famotidine (Pepcid) 20 mg Q12 PO  Last administered on 2/12/17at 09:00; Admin 

Dose 20 MG;  Start 2/7/17 at 21:00


Lactulose (Enulose) 20 gm BID  PRN PO CONSTIPATION Last administered on 2/10/

17at 21:06; Admin Dose 20 GM;  Start 2/8/17 at 15:00


Tamsulosin HCl (Flomax) 0.4 mg HS PO  Last administered on 2/11/17at 20:35; 

Admin Dose 0.4 MG;  Start 2/10/17 at 21:00


Finasteride (Proscar) 5 mg DAILY PO  Last administered on 2/12/17at 10:52; 

Admin Dose 5 MG;  Start 2/10/17 at 19:00


Lorazepam (Ativan) 1 mg HS PO  Last administered on 2/11/17at 20:35; Admin Dose 

1 MG;  Start 2/10/17 at 22:57











LEONILA AKINS M.D. Feb 12, 2017 11:26
Date/Time of Note


Date/Time of Note


DATE: 2/13/17 


TIME: 14:18





Assessment/Plan


Assessment/Plan


Chief Complaint/Hosp Course


61 yo with elevated PSA and diffuse bone mets with high suspicion for prostate 

cancer. Pt has been started on Lupron in house. Per urology given the PSA over 

50, his hard prostate on the rectal exam and bone scan with metastatic disease 

this is consistent with metastatic prostate cancer. Patient is scheduled for 

TURP to help with his voiding sx.





-Continue the finasteride, tamsulosin for BPH symptoms


-cont Lupron Depot given high probability of prostate cancer


-agree with TURP to be done today. Will follow up pathology form this procedure.


Problems:  





Consultation Date/Type/Reason


Admit Date/Time


Feb 7, 2017 at 14:11


Initial Consult Date


2/12/17


Type of Consultation:  Oncology


Reason for Consultation


prostate ca


Referring Provider:  VALDEMAR COCHRAN MD





24 HR Interval Summary


Free Text/Dictation


no acute overnight events.  pt still voiding but having difficulty emptying the 

bladder





Exam/Review of Systems


Vital Signs


Vitals





 Vital Signs








  Date Time  Temp Pulse Resp B/P Pulse Ox O2 Delivery O2 Flow Rate FiO2


 


2/13/17 08:04 97.4 62 18 135/75 92   


 


2/12/17 19:45      Room Air  














 Intake and Output   


 


 2/12/17 2/12/17 2/13/17





 15:00 23:00 07:00


 


Intake Total  960 ml 2400 ml


 


Output Total  1560 ml 2200 ml


 


Balance  -600 ml 200 ml











Exam


Constitutional:  alert


Psych:  nl mood/affect, no complaints


Head:  atraumatic, normocephalic


Eyes:  nl conjunctiva


ENMT:  nl external ears & nose


Neck:  non-tender, supple


Respiratory:  clear to auscultation, normal air movement


Cardiovascular:  nl pulses, regular rate and rhythm


Gastrointestinal:  soft


Musculoskeletal:  nl extremities to inspection





Medications


Medications





 Current Medications


Sodium Chloride (NS) 1,000 ml @  0 mls/hr [id number] IV  Last administered on 2/9/ 17at 08:27; Admin Dose 75 MLS/HR;  Start 2/7/17 at 15:13


Ondansetron HCl (Zofran Inj) 4 mg Q6H  PRN IV NAUSEA AND/OR VOMITING;  Start 2/7 /17 at 15:30


Acetaminophen (Tylenol Tab) 650 mg Q6H  PRN PO PAIN LEVEL 1-3 OR FEVER;  Start 2 /7/17 at 15:30


Acetaminophen/ Hydrocodone Bitart (Norco (5/325)) 1 tab Q6H  PRN PO MODERATE 

PAIN LEVEL 4-6 Last administered on 2/13/17at 11:28; Admin Dose 1 TAB;  Start 2/ 7/17 at 15:30


Morphine Sulfate (morphine) 2 mg Q4H  PRN IV SEVERE PAIN LEVEL 7-10;  Start 2/7/ 17 at 15:30


Magnesium Hydroxide (Milk Of Mag) 30 ml DAILY  PRN PO CONSTIPATION Last 

administered on 2/11/17at 12:48; Admin Dose 30 ML;  Start 2/7/17 at 15:30


Lactulose (Enulose) 20 gm BID  PRN PO CONSTIPATION Last administered on 2/12/ 17at 20:41; Admin Dose 20 GM;  Start 2/8/17 at 15:00


Tamsulosin HCl (Flomax) 0.4 mg HS PO  Last administered on 2/12/17at 20:41; 

Admin Dose 0.4 MG;  Start 2/10/17 at 21:00


Finasteride (Proscar) 5 mg DAILY PO  Last administered on 2/13/17at 09:16; 

Admin Dose 5 MG;  Start 2/10/17 at 19:00


Lorazepam (Ativan) 1 mg HS PO  Last administered on 2/12/17at 20:41; Admin Dose 

1 MG;  Start 2/10/17 at 22:57


Bisacodyl (Dulcolax Supp) 10 mg DAILY  PRN [place] CONSTIPATION Last administered on 

2/12/17at 22:23; Admin Dose 10 MG;  Start 2/12/17 at 22:00


Famotidine (Pepcid) 20 mg DAILY PO ;  Start 2/14/17 at 09:00











LEONILA AKINS M.D. Feb 13, 2017 14:24
Date/Time of Note


Date/Time of Note


DATE: 2/14/17 


TIME: 11:58





Assessment/Plan


Assessment/Plan


Chief Complaint/Hosp Course


63 yo with elevated PSA and diffuse bone mets with high suspicion for prostate 

cancer. Pt has been started on Lupron in house. Per urology given the PSA over 

50, his hard prostate on the rectal exam and bone scan with metastatic disease 

this is consistent with metastatic prostate cancer. Patient is now s/p  TURP to 

help with his voiding sx.





-Continue the finasteride, tamsulosin for BPH symptoms


-cont Lupron Depot given high probability of prostate cancer


-Will follow up pathology form the TURP procedure


Problems:  





Consultation Date/Type/Reason


Admit Date/Time


Feb 7, 2017 at 14:11


Initial Consult Date


2/12/17


Type of Consultation:  Oncology


Reason for Consultation


Prostate cancer


Referring Provider:  VALDEMAR COCHRAN MD





24 HR Interval Summary


Free Text/Dictation


pt is s/p TURP yesterday. States he is already voiding better,





Exam/Review of Systems


Vital Signs


Vitals





 Vital Signs








  Date Time  Temp Pulse Resp B/P Pulse Ox O2 Delivery O2 Flow Rate FiO2


 


2/14/17 07:42 98.2 86 16 120/68 98   


 


2/13/17 21:30      Nasal Cannula 2.0 














 Intake and Output   


 


 2/13/17 2/13/17 2/14/17





 15:00 23:00 07:00


 


Intake Total  04674 ml 550 ml


 


Output Total  1900 ml 1550 ml


 


Balance  44886 ml -1000 ml











Exam


Constitutional:  alert, oriented


Psych:  no complaints


Head:  atraumatic, normocephalic


Eyes:  nl conjunctiva


ENMT:  nl external ears & nose


Neck:  non-tender, supple


Respiratory:  clear to auscultation


Cardiovascular:  nl pulses, regular rate and rhythm


Gastrointestinal:  soft


Genitourinary - Male:  other (estrada in place)


Musculoskeletal:  nl extremities to inspection, nl gait and stance





Results


Result Diagram:  


2/14/17 0455                                                                   

             2/14/17 0455





Results 24 hrs





Laboratory Tests








Test


  2/14/17


04:55


 


Alanine Aminotransferase


(ALT/SGPT) 29  


 


 


Albumin 3.3  


 


Albumin/Globulin Ratio 1.03  


 


Alkaline Phosphatase 283  H


 


Anion Gap 12  


 


Aspartate Amino Transf


(AST/SGOT) 45  


 


 


Basophils # 0.0  


 


Basophils % 0.3  


 


Blood Morphology Comment   


 


Blood Urea Nitrogen 16  


 


Calcium Level 8.7  


 


Carbon Dioxide Level 29  


 


Chloride Level 101  


 


Creatinine 0.89  


 


Direct Bilirubin 0.00  


 


Eosinophils # 0.1  


 


Eosinophils % 1.0  


 


Globulin 3.20  


 


Glucose Level 98  


 


Hematocrit 35.8  L


 


Hemoglobin 12.3  L


 


INR International Normalized


Ratio 1.30  


 


 


Indirect Bilirubin 0.4  


 


Lymphocytes # 1.0  


 


Lymphocytes % 15.0  


 


Magnesium Level 2.0  


 


Mean Corpuscular Hemoglobin 31.4  


 


Mean Corpuscular Hemoglobin


Concent 34.2  


 


 


Mean Corpuscular Volume 91.6  


 


Mean Platelet Volume 7.2  L


 


Monocytes # 0.5  


 


Monocytes % 7.5  


 


Neutrophils # 5.3  


 


Neutrophils % 76.2  


 


Nucleated Red Blood Cells # 0.0  


 


Nucleated Red Blood Cells % 0.0  


 


Phosphorus Level 4.5  


 


Platelet Count 242  #


 


Potassium Level 4.2  


 


Prothrombin Time 16.3  H


 


Prothrombin Time Ratio 1.3  


 


Red Blood Count 3.91  L


 


Red Cell Distribution Width 12.4  


 


Sodium Level 138  


 


Total Bilirubin 0.4  


 


Total Protein 6.5  


 


White Blood Count 7.0  











Medications


Medications





 Current Medications


Sodium Chloride (NS) 1,000 ml @  0 mls/hr [id number] IV  Last administered on 2/9/ 17at 08:27; Admin Dose 75 MLS/HR;  Start 2/7/17 at 15:13


Ondansetron HCl (Zofran Inj) 4 mg Q6H  PRN IV NAUSEA AND/OR VOMITING;  Start 2/7 /17 at 15:30


Acetaminophen (Tylenol Tab) 650 mg Q6H  PRN PO PAIN LEVEL 1-3 OR FEVER;  Start 2 /7/17 at 15:30


Acetaminophen/ Hydrocodone Bitart (Norco (5/325)) 1 tab Q6H  PRN PO MODERATE 

PAIN LEVEL 4-6 Last administered on 2/13/17at 11:28; Admin Dose 1 TAB;  Start 2/ 7/17 at 15:30


Morphine Sulfate (morphine) 2 mg Q4H  PRN IV SEVERE PAIN LEVEL 7-10 Last 

administered on 2/14/17at 10:26; Admin Dose 2 MG;  Start 2/7/17 at 15:30


Magnesium Hydroxide (Milk Of Mag) 30 ml DAILY  PRN PO CONSTIPATION Last 

administered on 2/11/17at 12:48; Admin Dose 30 ML;  Start 2/7/17 at 15:30


Lactulose (Enulose) 20 gm BID  PRN PO CONSTIPATION Last administered on 2/12/ 17at 20:41; Admin Dose 20 GM;  Start 2/8/17 at 15:00


Tamsulosin HCl (Flomax) 0.4 mg HS PO  Last administered on 2/13/17at 21:25; 

Admin Dose 0.4 MG;  Start 2/10/17 at 21:00


Finasteride (Proscar) 5 mg DAILY PO  Last administered on 2/14/17at 08:18; 

Admin Dose 5 MG;  Start 2/10/17 at 19:00


Lorazepam (Ativan) 1 mg HS PO  Last administered on 2/13/17at 21:25; Admin Dose 

1 MG;  Start 2/10/17 at 22:57


Bisacodyl (Dulcolax Supp) 10 mg DAILY  PRN [place] CONSTIPATION Last administered on 

2/14/17at 08:20; Admin Dose 10 MG;  Start 2/12/17 at 22:00


Famotidine (Pepcid) 20 mg DAILY PO  Last administered on 2/14/17at 08:18; Admin 

Dose 20 MG;  Start 2/14/17 at 09:00











LEONILA AKINS M.D. Feb 14, 2017 12:00
Date/Time of Note


Date/Time of Note


DATE: 2/15/17 


TIME: 10:20





Assessment/Plan


Assessment/Plan


Chief Complaint/Hosp Course


63 yo with elevated PSA and diffuse bone mets with high suspicion for prostate 

cancer. Pt has been started on Lupron in house. Per urology given the PSA over 

50, his hard prostate on the rectal exam and bone scan with metastatic disease 

this is consistent with metastatic prostate cancer. Patient is now s/p  TURP to 

help with his voiding sx.





-Continue the finasteride, tamsulosin for BPH symptoms


-cont Lupron Depot given high probability of prostate cancer


-Will follow up pathology form the TURP procedure


-continue bladder irrigation per urology





Approximately 40 min were spent at patients bedside and in coordination of his 

care


Problems:  





Consultation Date/Type/Reason


Admit Date/Time


Feb 7, 2017 at 14:11


Initial Consult Date


2/12/17


Type of Consultation:  Oncology


Reason for Consultation


prostate cancer


Referring Provider:  VALDEMAR COCHRAN MD





24 HR Interval Summary


Free Text/Dictation


status post transurethral resection of the prostate.  no pain. feels better 

wince surgery





Exam/Review of Systems


Vital Signs


Vitals





 Vital Signs








  Date Time  Temp Pulse Resp B/P Pulse Ox O2 Delivery O2 Flow Rate FiO2


 


2/15/17 07:58 98.5 94 20 111/68 93   


 


2/13/17 21:30      Nasal Cannula 2.0 














 Intake and Output   


 


 2/14/17 2/14/17 2/15/17





 15:00 23:00 07:00


 


Intake Total   4000 ml


 


Output Total  1800 ml 1100 ml


 


Balance  -1800 ml 2900 ml











Exam


Constitutional:  alert, oriented


Psych:  no complaints


Head:  atraumatic, normocephalic


Eyes:  nl conjunctiva


ENMT:  nl external ears & nose


Neck:  non-tender, supple


Respiratory:  clear to auscultation, normal air movement


Cardiovascular:  nl pulses, regular rate and rhythm


Gastrointestinal:  soft


Genitourinary - Male:  other (estrada in place. urine is clear)


Musculoskeletal:  nl extremities to inspection


Extremities:  normal pulses





Results


Result Diagram:  


2/14/17 5384                                                                   

             2/14/17 0510





Results 24 hrs





Laboratory Tests








Test


  2/14/17


18:54


 


Anion Gap 15  


 


Blood Urea Nitrogen 13  


 


Calcium Level 8.8  


 


Carbon Dioxide Level 31  


 


Chloride Level 91  #L


 


Creatinine 0.99  


 


Glucose Level 135  


 


Potassium Level 3.8  


 


Sodium Level 133  L











Medications


Medications





 Current Medications


Sodium Chloride (NS) 1,000 ml @  0 mls/hr [id number] IV  Last administered on 2/9/ 17at 08:27; Admin Dose 75 MLS/HR;  Start 2/7/17 at 15:13


Ondansetron HCl (Zofran Inj) 4 mg Q6H  PRN IV NAUSEA AND/OR VOMITING;  Start 2/7 /17 at 15:30


Acetaminophen (Tylenol Tab) 650 mg Q6H  PRN PO PAIN LEVEL 1-3 OR FEVER;  Start 2 /7/17 at 15:30


Acetaminophen/ Hydrocodone Bitart (Norco (5/325)) 1 tab Q6H  PRN PO MODERATE 

PAIN LEVEL 4-6 Last administered on 2/13/17at 11:28; Admin Dose 1 TAB;  Start 2/ 7/17 at 15:30


Morphine Sulfate (morphine) 2 mg Q4H  PRN IV SEVERE PAIN LEVEL 7-10 Last 

administered on 2/15/17at 08:33; Admin Dose 2 MG;  Start 2/7/17 at 15:30


Magnesium Hydroxide (Milk Of Mag) 30 ml DAILY  PRN PO CONSTIPATION Last 

administered on 2/11/17at 12:48; Admin Dose 30 ML;  Start 2/7/17 at 15:30


Lactulose (Enulose) 20 gm BID  PRN PO CONSTIPATION Last administered on 2/14/ 17at 20:03; Admin Dose 20 GM;  Start 2/8/17 at 15:00


Tamsulosin HCl (Flomax) 0.4 mg HS PO  Last administered on 2/14/17at 20:03; 

Admin Dose 0.4 MG;  Start 2/10/17 at 21:00


Finasteride (Proscar) 5 mg DAILY PO  Last administered on 2/15/17at 08:32; 

Admin Dose 5 MG;  Start 2/10/17 at 19:00


Lorazepam (Ativan) 1 mg HS PO  Last administered on 2/14/17at 20:03; Admin Dose 

1 MG;  Start 2/10/17 at 22:57


Bisacodyl (Dulcolax Supp) 10 mg DAILY  PRN [place] CONSTIPATION Last administered on 

2/14/17at 08:20; Admin Dose 10 MG;  Start 2/12/17 at 22:00


Famotidine (Pepcid) 20 mg DAILY PO  Last administered on 2/15/17at 08:32; Admin 

Dose 20 MG;  Start 2/14/17 at 09:00











LEONILA AKINS M.D. Feb 15, 2017 10:47
minimal